# Patient Record
Sex: FEMALE | Race: WHITE
[De-identification: names, ages, dates, MRNs, and addresses within clinical notes are randomized per-mention and may not be internally consistent; named-entity substitution may affect disease eponyms.]

---

## 2017-07-20 ENCOUNTER — HOSPITAL ENCOUNTER (OUTPATIENT)
Dept: HOSPITAL 75 - RAD | Age: 62
End: 2017-07-20
Attending: NURSE PRACTITIONER
Payer: COMMERCIAL

## 2017-07-20 DIAGNOSIS — Z12.31: Primary | ICD-10-CM

## 2017-07-20 PROCEDURE — 77067 SCR MAMMO BI INCL CAD: CPT

## 2017-07-26 NOTE — DIAGNOSTIC IMAGING REPORT
Bilateral screening mammogram 2D views with tomosynthesis.



The current study was also evaluated with a Computer Aided

Detection (CAD) system.



INDICATION: Screening. No current complaints stated on the

questionnaire.



COMPARISON: 6/21/16.



FINDINGS:



The breasts are composed of heterogeneously dense parenchyma

which may decrease mammographic sensitivity. Allowing for

technique and positional differences, no suspicious change is

seen.



IMPRESSION: Dense breasts with no definite change. 



ACR BI-RADS Category 2: Benign findings.

Result letter will be mailed to the patient.

Note: At least 10% of breast cancer is not imaged by mammography.



Dictated by: 



  Dictated on workstation # [id number]

## 2018-06-15 ENCOUNTER — HOSPITAL ENCOUNTER (INPATIENT)
Dept: HOSPITAL 75 - ER | Age: 63
LOS: 2 days | Discharge: HOME | DRG: 638 | End: 2018-06-17
Attending: FAMILY MEDICINE | Admitting: FAMILY MEDICINE
Payer: COMMERCIAL

## 2018-06-15 VITALS — BODY MASS INDEX: 30.49 KG/M2 | HEIGHT: 67 IN | WEIGHT: 194.3 LBS

## 2018-06-15 VITALS — DIASTOLIC BLOOD PRESSURE: 53 MMHG | SYSTOLIC BLOOD PRESSURE: 96 MMHG

## 2018-06-15 VITALS — DIASTOLIC BLOOD PRESSURE: 64 MMHG | SYSTOLIC BLOOD PRESSURE: 114 MMHG

## 2018-06-15 VITALS — SYSTOLIC BLOOD PRESSURE: 108 MMHG | DIASTOLIC BLOOD PRESSURE: 57 MMHG

## 2018-06-15 VITALS — SYSTOLIC BLOOD PRESSURE: 116 MMHG | DIASTOLIC BLOOD PRESSURE: 59 MMHG

## 2018-06-15 DIAGNOSIS — N17.9: ICD-10-CM

## 2018-06-15 DIAGNOSIS — E86.0: ICD-10-CM

## 2018-06-15 DIAGNOSIS — I12.9: ICD-10-CM

## 2018-06-15 DIAGNOSIS — E78.5: ICD-10-CM

## 2018-06-15 DIAGNOSIS — E10.10: Primary | ICD-10-CM

## 2018-06-15 DIAGNOSIS — R05: ICD-10-CM

## 2018-06-15 DIAGNOSIS — N18.9: ICD-10-CM

## 2018-06-15 DIAGNOSIS — N39.0: ICD-10-CM

## 2018-06-15 DIAGNOSIS — M17.0: ICD-10-CM

## 2018-06-15 DIAGNOSIS — Z79.4: ICD-10-CM

## 2018-06-15 DIAGNOSIS — E10.22: ICD-10-CM

## 2018-06-15 DIAGNOSIS — E10.40: ICD-10-CM

## 2018-06-15 LAB
ALBUMIN SERPL-MCNC: 4.3 GM/DL (ref 3.2–4.5)
ALP SERPL-CCNC: 121 U/L (ref 40–136)
ALT SERPL-CCNC: 28 U/L (ref 0–55)
APTT PPP: YELLOW S
BACTERIA #/AREA URNS HPF: (no result) /HPF
BASOPHILS # BLD AUTO: 0 10^3/UL (ref 0–0.1)
BASOPHILS NFR BLD AUTO: 1 % (ref 0–10)
BILIRUB SERPL-MCNC: 0.4 MG/DL (ref 0.1–1)
BILIRUB UR QL STRIP: NEGATIVE
BUN/CREAT SERPL: 18
BUN/CREAT SERPL: 18
BUN/CREAT SERPL: 19
CALCIUM SERPL-MCNC: 8.7 MG/DL (ref 8.5–10.1)
CALCIUM SERPL-MCNC: 9.3 MG/DL (ref 8.5–10.1)
CALCIUM SERPL-MCNC: 9.8 MG/DL (ref 8.5–10.1)
CHLORIDE SERPL-SCNC: 100 MMOL/L (ref 98–107)
CHLORIDE SERPL-SCNC: 103 MMOL/L (ref 98–107)
CHLORIDE SERPL-SCNC: 96 MMOL/L (ref 98–107)
CO2 SERPL-SCNC: 12 MMOL/L (ref 21–32)
CO2 SERPL-SCNC: 7 MMOL/L (ref 21–32)
CO2 SERPL-SCNC: 7 MMOL/L (ref 21–32)
CREAT SERPL-MCNC: 2.01 MG/DL (ref 0.6–1.3)
CREAT SERPL-MCNC: 2.05 MG/DL (ref 0.6–1.3)
CREAT SERPL-MCNC: 2.11 MG/DL (ref 0.6–1.3)
EOSINOPHIL # BLD AUTO: 0 10^3/UL (ref 0–0.3)
EOSINOPHIL NFR BLD AUTO: 0 % (ref 0–10)
ERYTHROCYTE [DISTWIDTH] IN BLOOD BY AUTOMATED COUNT: 12.3 % (ref 10–14.5)
FIBRINOGEN PPP-MCNC: (no result) MG/DL
GFR SERPLBLD BASED ON 1.73 SQ M-ARVRAT: 24 ML/MIN
GFR SERPLBLD BASED ON 1.73 SQ M-ARVRAT: 25 ML/MIN
GFR SERPLBLD BASED ON 1.73 SQ M-ARVRAT: 25 ML/MIN
GLUCOSE SERPL-MCNC: 637 MG/DL (ref 70–105)
GLUCOSE SERPL-MCNC: 737 MG/DL (ref 70–105)
GLUCOSE SERPL-MCNC: 770 MG/DL (ref 70–105)
GLUCOSE UR STRIP-MCNC: (no result) MG/DL
HCT VFR BLD CALC: 33 % (ref 35–52)
HGB BLD-MCNC: 11.2 G/DL (ref 11.5–16)
KETONES UR QL STRIP: (no result)
LEUKOCYTE ESTERASE UR QL STRIP: (no result)
LYMPHOCYTES # BLD AUTO: 1 X 10^3 (ref 1–4)
LYMPHOCYTES NFR BLD AUTO: 12 % (ref 12–44)
MANUAL DIFFERENTIAL PERFORMED BLD QL: NO
MCH RBC QN AUTO: 30 PG (ref 25–34)
MCHC RBC AUTO-ENTMCNC: 34 G/DL (ref 32–36)
MCV RBC AUTO: 89 FL (ref 80–99)
MONOCYTES # BLD AUTO: 0.3 X 10^3 (ref 0–1)
MONOCYTES NFR BLD AUTO: 4 % (ref 0–12)
NEUTROPHILS # BLD AUTO: 7 X 10^3 (ref 1.8–7.8)
NEUTROPHILS NFR BLD AUTO: 83 % (ref 42–75)
NITRITE UR QL STRIP: NEGATIVE
PH UR STRIP: 5 [PH] (ref 5–9)
PLATELET # BLD: 330 10^3/UL (ref 130–400)
PMV BLD AUTO: 10 FL (ref 7.4–10.4)
POTASSIUM SERPL-SCNC: 4.7 MMOL/L (ref 3.6–5)
POTASSIUM SERPL-SCNC: 5.4 MMOL/L (ref 3.6–5)
POTASSIUM SERPL-SCNC: 6.2 MMOL/L (ref 3.6–5)
PROT SERPL-MCNC: 7 GM/DL (ref 6.4–8.2)
PROT UR QL STRIP: NEGATIVE
RBC # BLD AUTO: 3.69 10^6/UL (ref 4.35–5.85)
RBC #/AREA URNS HPF: (no result) /HPF
SODIUM SERPL-SCNC: 132 MMOL/L (ref 135–145)
SODIUM SERPL-SCNC: 132 MMOL/L (ref 135–145)
SODIUM SERPL-SCNC: 134 MMOL/L (ref 135–145)
SP GR UR STRIP: 1.01 (ref 1.02–1.02)
UROBILINOGEN UR-MCNC: NORMAL MG/DL
WBC # BLD AUTO: 8.4 10^3/UL (ref 4.3–11)
WBC #/AREA URNS HPF: (no result) /HPF

## 2018-06-15 PROCEDURE — 80053 COMPREHEN METABOLIC PANEL: CPT

## 2018-06-15 PROCEDURE — 83735 ASSAY OF MAGNESIUM: CPT

## 2018-06-15 PROCEDURE — 87088 URINE BACTERIA CULTURE: CPT

## 2018-06-15 PROCEDURE — 81000 URINALYSIS NONAUTO W/SCOPE: CPT

## 2018-06-15 PROCEDURE — 71046 X-RAY EXAM CHEST 2 VIEWS: CPT

## 2018-06-15 PROCEDURE — 87081 CULTURE SCREEN ONLY: CPT

## 2018-06-15 PROCEDURE — 82962 GLUCOSE BLOOD TEST: CPT

## 2018-06-15 PROCEDURE — 36415 COLL VENOUS BLD VENIPUNCTURE: CPT

## 2018-06-15 PROCEDURE — 83036 HEMOGLOBIN GLYCOSYLATED A1C: CPT

## 2018-06-15 PROCEDURE — 84100 ASSAY OF PHOSPHORUS: CPT

## 2018-06-15 PROCEDURE — 80048 BASIC METABOLIC PNL TOTAL CA: CPT

## 2018-06-15 PROCEDURE — 71045 X-RAY EXAM CHEST 1 VIEW: CPT

## 2018-06-15 PROCEDURE — 84484 ASSAY OF TROPONIN QUANT: CPT

## 2018-06-15 PROCEDURE — 96361 HYDRATE IV INFUSION ADD-ON: CPT

## 2018-06-15 PROCEDURE — 93005 ELECTROCARDIOGRAM TRACING: CPT

## 2018-06-15 PROCEDURE — 96375 TX/PRO/DX INJ NEW DRUG ADDON: CPT

## 2018-06-15 PROCEDURE — 85025 COMPLETE CBC W/AUTO DIFF WBC: CPT

## 2018-06-15 PROCEDURE — 96365 THER/PROPH/DIAG IV INF INIT: CPT

## 2018-06-15 RX ADMIN — DEXTROSE MONOHYDRATE, SODIUM CHLORIDE, AND POTASSIUM CHLORIDE SCH MLS/HR: 50; 4.5; 1.49 INJECTION, SOLUTION INTRAVENOUS at 23:09

## 2018-06-15 RX ADMIN — DEXTROSE MONOHYDRATE, SODIUM CHLORIDE, AND POTASSIUM CHLORIDE SCH MLS/HR: 50; 4.5; 1.49 INJECTION, SOLUTION INTRAVENOUS at 20:04

## 2018-06-15 RX ADMIN — SODIUM CHLORIDE SCH MLS/HR: 900 INJECTION, SOLUTION INTRAVENOUS at 20:06

## 2018-06-15 RX ADMIN — SODIUM CHLORIDE AND POTASSIUM CHLORIDE SCH MLS/HR: 4.5; 1.49 INJECTION, SOLUTION INTRAVENOUS at 20:04

## 2018-06-15 RX ADMIN — DEXTROSE MONOHYDRATE SCH MLS/HR: 100 INJECTION, SOLUTION INTRAVENOUS at 20:04

## 2018-06-15 RX ADMIN — SODIUM CHLORIDE SCH MLS/HR: 4.5 INJECTION, SOLUTION INTRAVENOUS at 23:08

## 2018-06-15 RX ADMIN — SODIUM CHLORIDE SCH MLS/HR: 900 INJECTION, SOLUTION INTRAVENOUS at 20:05

## 2018-06-15 RX ADMIN — SODIUM CHLORIDE SCH MLS/HR: 900 INJECTION, SOLUTION INTRAVENOUS at 19:08

## 2018-06-15 RX ADMIN — SODIUM CHLORIDE SCH MLS/HR: 4.5 INJECTION, SOLUTION INTRAVENOUS at 20:04

## 2018-06-15 RX ADMIN — DEXTROSE MONOHYDRATE AND SODIUM CHLORIDE SCH MLS/HR: 5; .45 INJECTION, SOLUTION INTRAVENOUS at 19:12

## 2018-06-15 RX ADMIN — SODIUM CHLORIDE AND POTASSIUM CHLORIDE SCH MLS/HR: 4.5; 1.49 INJECTION, SOLUTION INTRAVENOUS at 23:44

## 2018-06-15 NOTE — ED GENERAL
General


Chief Complaint:  General Problems/Pain


Stated Complaint:  DEHYDRATION


Source of Information:  Patient


Exam Limitations:  No Limitations





History of Present Illness


Date Seen by Provider:  Yonatan 15, 2018


Time Seen by Provider:  17:11


Initial Comments


To ER per private vehicle with concerns of dehydration. She has a diabetic with 

insulin pump. She was feeling fine until about 3 days ago and that she had a 

sensation of general malaise and weakness, about her. She is a  patient of Dr. Salamanca at [facility] and Brady GARCIA nurse practitioner at [facility].

  Her blood sugar yesterday was in the 750 range, today just before arrival 

here was 501 and she turned her insulin pump off because she states "I didn't 

have my wits about me".


Timing/Duration:  2-3 Days


Severity:  Moderate


Associated Systoms:  Headaches, Malaise, Nausea/Vomiting





Allergies and Home Medications


Allergies


Uncoded Allergies:  


     SULFA (Adverse Reaction, 11)





Home Medications


Metformin Hcl 500 Mg Tablet, 2 PO DAILY, (Reported)





Patient Home Medication List


Home Medication List Reviewed:  Yes





Review of Systems


Constitutional:  see HPI


EENTM:  see HPI


Respiratory:  no symptoms reported


Cardiovascular:  see HPI, chest pain (she has had chest pain but none currently)


Genitourinary:  no symptoms reported


Musculoskeletal:  no symptoms reported


Skin:  no symptoms reported





Past Medical-Social-Family Hx


Patient Social History


Recent Foreign Travel:  No


Contact w/Someone Who Travel:  No





Physical Exam


Vital Signs





Vital Signs - First Documented








 6/15/18





 16:50


 


Temp 97.8


 


Pulse 94


 


Resp 20


 


B/P (MAP) 125/57 (79)


 


Pulse Ox 97





Capillary Refill :


General Appearance:  No Apparent Distress, WD/WN


Eyes:  Bilateral Eye Normal Inspection, Bilateral Eye PERRL, Bilateral Eye EOMI


HEENT:  PERRL/EOMI, TMs Normal


Neck:  Full Range of Motion, Normal Inspection


Respiratory:  Normal Breath Sounds, No Accessory Muscle Use, No Respiratory 

Distress


Cardiovascular:  Regular Rate, Rhythm, Normal Peripheral Pulses


Gastrointestinal:  Normal Bowel Sounds, Non Tender, Soft


Extremity:  Normal Capillary Refill, Normal Inspection


Neurologic/Psychiatric:  Alert, Oriented x3


Skin:  Normal Color, Warm/Dry





Progress/Results/Core Measures


Suspected Sepsis


SIRS


Temperature: 


Pulse:  


Respiratory Rate: 


 


Laboratory Tests


6/15/18 17:05: White Blood Count 8.4


Blood Pressure  / 


Mean: 


 





Laboratory Tests


6/15/18 17:05: 


Creatinine 2.11H, Platelet Count 330, Total Bilirubin 0.4








Results/Orders


Lab Results





Laboratory Tests








Test


 6/15/18


17:00 6/15/18


17:05 6/15/18


17:27 Range/Units


 


 


Urine Color YELLOW     


 


Urine Clarity


 SLIGHTLY


CLOUDY 


 


  





 


Urine pH 5    5-9  


 


Urine Specific Gravity 1.015 L   1.016-1.022  


 


Urine Protein NEGATIVE    NEGATIVE  


 


Urine Glucose (UA) 4+ H   NEGATIVE  


 


Urine Ketones 4+ H   NEGATIVE  


 


Urine Nitrite NEGATIVE    NEGATIVE  


 


Urine Bilirubin NEGATIVE    NEGATIVE  


 


Urine Urobilinogen NORMAL    NORMAL  MG/DL


 


Urine Leukocyte Esterase 3+ H   NEGATIVE  


 


Urine RBC (Auto) NEGATIVE    NEGATIVE  


 


Urine RBC NONE     /HPF


 


Urine WBC 25-50 H    /HPF


 


Urine Squamous Epithelial


Cells 10-25 H


 


 


  /HPF





 


Urine Crystals NONE     /LPF


 


Urine Bacteria FEW H    /HPF


 


Urine Casts NONE     /LPF


 


Urine Mucus NEGATIVE     /LPF


 


Urine Culture Indicated YES     


 


White Blood Count


 


 8.4 


 


 4.3-11.0


10^3/uL


 


Red Blood Count


 


 3.69 L


 


 4.35-5.85


10^6/uL


 


Hemoglobin  11.2 L  11.5-16.0  G/DL


 


Hematocrit  33 L  35-52  %


 


Mean Corpuscular Volume  89   80-99  FL


 


Mean Corpuscular Hemoglobin  30   25-34  PG


 


Mean Corpuscular Hemoglobin


Concent 


 34 


 


 32-36  G/DL





 


Red Cell Distribution Width  12.3   10.0-14.5  %


 


Platelet Count


 


 330 


 


 130-400


10^3/uL


 


Mean Platelet Volume  10.0   7.4-10.4  FL


 


Neutrophils (%) (Auto)  83 H  42-75  %


 


Lymphocytes (%) (Auto)  12   12-44  %


 


Monocytes (%) (Auto)  4   0-12  %


 


Eosinophils (%) (Auto)  0   0-10  %


 


Basophils (%) (Auto)  1   0-10  %


 


Neutrophils # (Auto)  7.0   1.8-7.8  X 10^3


 


Lymphocytes # (Auto)  1.0   1.0-4.0  X 10^3


 


Monocytes # (Auto)  0.3   0.0-1.0  X 10^3


 


Eosinophils # (Auto)


 


 0.0 


 


 0.0-0.3


10^3/uL


 


Basophils # (Auto)


 


 0.0 


 


 0.0-0.1


10^3/uL


 


Sodium Level  132 L  135-145  MMOL/L


 


Potassium Level  5.4 H  3.6-5.0  MMOL/L


 


Chloride Level  96 L    MMOL/L


 


Carbon Dioxide Level  12 L  21-32  MMOL/L


 


Anion Gap  24 H  5-14  MMOL/L


 


Blood Urea Nitrogen  38 H  7-18  MG/DL


 


Creatinine


 


 2.11 H


 


 0.60-1.30


MG/DL


 


Estimat Glomerular Filtration


Rate 


 24 


 


  





 


BUN/Creatinine Ratio  18    


 


Glucose Level  770 *H    MG/DL


 


Calcium Level  9.8   8.5-10.1  MG/DL


 


Total Bilirubin  0.4   0.1-1.0  MG/DL


 


Aspartate Amino Transf


(AST/SGOT) 


 17 


 


 5-34  U/L





 


Alanine Aminotransferase


(ALT/SGPT) 


 28 


 


 0-55  U/L





 


Alkaline Phosphatase  121     U/L


 


Troponin I  < 0.30   <0.30  NG/ML


 


Total Protein  7.0   6.4-8.2  GM/DL


 


Albumin  4.3   3.2-4.5  GM/DL


 


Glucometer   > 600 *H   MG/DL








My Orders





Orders - MARINA OWEN


Cbc With Automated Diff (6/15/18 17:09)


Comprehensive Metabolic Panel (6/15/18 17:09)


Ua Culture If Indicated (6/15/18 17:09)


Iv Heplock-Insert (Order) (6/15/18 17:09)


Accucheck Stat ONCE (6/15/18 17:09)


Chest Pa/Lat (2 View) (6/15/18 17:09)


Ns Iv 1000 Ml (Sodium Chloride 0.9%) (6/15/18 17:15)


Insulin (Regular) Human (Humulin R (Per (6/15/18 17:15)


Ekg Tracing (6/15/18 17:13)


Troponin I (6/15/18 17:13)


Urine Culture (6/15/18 17:00)


Ceftriaxone Injection (Rocephin Injectio (6/15/18 17:30)





Medications Given in ED





Current Medications








 Medications  Dose


 Ordered  Sig/Casey


 Route  Start Time


 Stop Time Status Last Admin


Dose Admin


 


 Ceftriaxone


 Sodium 1000 mg/


 Sodium Chloride  50 ml @ 


 100 mls/hr  ONCE  ONCE


 IV  6/15/18 17:30


 6/15/18 17:59 DC 6/15/18 17:30


100 MLS/HR


 


 Insulin Human


 Regular  10 unit  ONCE  ONCE


 IV  6/15/18 17:15


 6/15/18 17:16 DC 6/15/18 17:30


10 UNIT








Vital Signs/I&O











 6/15/18





 16:50


 


Temp 97.8


 


Pulse 94


 


Resp 20


 


B/P (MAP) 125/57 (79)


 


Pulse Ox 97





Capillary Refill :





Diagnostic Imaging





   Diagonstic Imaging:  Xray


Comments


NAME:   MELANI HARMON


[facility] REC#:   [id number]


ACCOUNT#:   [id number]


PT STATUS:   REG ER


:   1955


PHYSICIAN:   MARINA OWEN


ADMIT DATE:   06/15/18/ER


 ***Draft***


Date of Exam:06/15/18





CHEST PA/LAT (2 VIEW)








INDICATION: Malaise and illness.





EXAMINATION: PA and lateral views of the chest were obtained.





FINDINGS: Heart size and pulmonary vascularity are within normal


limits. There is no pneumothorax or consolidation. Tiny nodular


density in the right midlung may be due to prominent vessel or


granuloma. There is no evidence of infiltrate, pneumothorax or


pleural fluid. Metallic linear densities project over the left


shoulder and may reside within a clavicle.





IMPRESSION: No acute abnormality is identified. Possible metallic


foreign body is seen in region of the left clavicle. 





  Dictated on workstation # [id number]








Dict:   06/15/18 175


Trans:   06/15/18 1757


[facility] [phone]





Interpreted by:     MEHREEN DOSS MD


Electronically signed by:





Departure


Communication (Admissions)


Time/Spoke to Admitting Phy:  18:11


Spoke with Dr. Mayer, agrees to admit. We'll place in the ICU on DKA protocol.


I did clarify with the patient in regards to whether or not she sees Brady GARCIA 

at the walk-in clinic or at scheduled visits. She states that she sees him 

during scheduled clinic visits. As such, we'll proceed with admission to 

[facility].





Impression





 Primary Impression:  


 DKA (diabetic ketoacidoses)


 Additional Impression:  


 Urinary tract infection


Disposition:   ADMITTED AS INPATIENT


Condition:  Stable





Admissions


Decision to Admit Reason:  Admit from ER (General)


Decision to Admit/Date:  Yonatan 15, 2018


Time/Decision to Admit Time:  17:59





Departure-Patient Inst.


Referrals:  


IBAN SALAMANCA DO (PCP/Family)


Primary Care Physician











MARINA OWEN Yonatan 15, 2018 17:13

## 2018-06-15 NOTE — XMS REPORT
Continuity of Care Document

 Created on: 06/15/2018



MELANI HARMON

External Reference #: [id number]

: 1955

Sex: Female



Demographics







 Address  [address]

[place]  [address]

 

 Home Phone  [phone] x

 

 Preferred Language  Unknown

 

 Marital Status  Unknown

 

 Jehovah's witness Affiliation  Unknown

 

 Race  Unknown

 

 Ethnic Group  Unknown





Author







 Author  [facility]

 

 Address  Unknown

 

 Phone  Unavailable



              



Allergies

      





 Active            Description            Code            Type            
Severity            Reaction            Onset            Reported/Identified   
         Relationship to Patient            Clinical Status        

 

 Yes            SULFA            SULFA                         Unknown         
   N/A                         2011                                  



                  



Medications

      



There is no data.                  



Problems

      





 Date Dx Coded            Attending            Type            Code            
Diagnosis            Diagnosed By        

 

 2011                         Ot            251.0                        
          

 

 2011                         Ot            251.2                        
          

 

 2011                         Ot            V58.67                       
           

 

 2013            MERCY ARCOS MD                         244.9         
   HYPOTHYROIDISM                     

 

 2013            MERCY ARCOS MD                         250.02        
    DIABETES MELLITUS TYPE 2 - UNCOMPLICATED, UNCONTROLLED                     

 

 2013            MERCY ARCOS MD                         796.2         
   Blood Pressure Isolated Elevated                     

 

 2013            MERCY ARCOS MD                         V07.4         
   taking female hormones for postmenopausal HRT                     

 

 2013                                      244.9            
HYPOTHYROIDISM                     

 

 2013                                      250.02            DIABETES 
MELLITUS TYPE 2 - UNCOMPLICATED, UNCONTROLLED                     

 

 2013                                      796.2            Blood 
Pressure Isolated Elevated                     

 

 2013                                      V07.4            taking female 
hormones for postmenopausal HRT                     

 

 2013            AV CRAIG MD                         244.9   
         HYPOTHYROIDISM                     

 

 2013            AV CRAIG MD                         250.02  
          DIABETES MELLITUS TYPE 2 - UNCOMPLICATED, UNCONTROLLED               
      

 

 2013            AV CRAIG MD                         796.2   
         Blood Pressure Isolated Elevated                     

 

 2013            AV CRAIG MD                         V07.4   
         taking female hormones for postmenopausal HRT                     

 

 2013                                      401.1            ESSENTIAL 
HYPERTENSION BENIGN                     

 

 2013            AV CRAIG MD                         401.1   
         ESSENTIAL HYPERTENSION BENIGN                     

 

 2013            AV CRAIG MD                         682.2   
         SKIN ABSCESS OF THE TRUNK - GROIN                     

 

 2015                         Ot            793.80                       
           

 

 2015            COSENS DO IBAN L            Ot            V76.12      
                            

 

 2015            COSENS DO IBAN L            Ot            V76.12      
                            

 

 2016            COSENS DO IBAN L            Ot            V76.12      
      OT SCREEN MAMMO-MALIGN NEOPLASM OF GUZMAN                     

 

 2016            MALORIE SEO            Ot            Z12.31   
         ENCNTR SCREEN MAMMOGRAM FOR MALIGNANT NE                     

 

 2016            MALORIE SEO            Ot            Z12.31   
         ENCNTR SCREEN MAMMOGRAM FOR MALIGNANT NE                     

 

 2017            IBAN SALAMANCA DO            Ot            V76.12      
      OTH SCREEN MAMMO-MALIGN NEOPLASM OF GUZMAN                     

 

 2017            MALORIE SEO            Ot            Z12.31   
         ENCNTR SCREEN MAMMOGRAM FOR MALIGNANT NE                     

 

 2017            ITZEL NEWSOME APRTERRI            Ot            Z12.31    
        ENCNTR SCREEN MAMMOGRAM FOR MALIGNANT NE                     

 

 2017            ITZEL NEWSOME APRN            Ot            Z12.31    
        ENCNTR SCREEN MAMMOGRAM FOR MALIGNANT NE                     



                                                                        



Procedures

      





 Code            Description            Performed By            Performed On   
     

 

             09981                                  A1C (IN-HOUSE)             
                      2013        

 

             [id number]                                  CULTURE MRSA               
                    2013        



                    



Results

      



There is no data.              



Encounters

      





 ACCT No.            Visit Date/Time            Discharge            Status    
        Pt. Type            Provider            Facility            Loc./Unit  
          Complaint        

 

 [id number]            2013 16:13:00            2013 23:59:59          
  CLS            Outpatient            AV CRAIG MD                 
                              

 

 [id number]            2013 16:16:00            2013 23:59:59          
  CLS            Outpatient            MERCY ARCOS MD                       
                        

 

 [id number]            2013 16:11:00                                      
Document Registration                                                          
  

 

 [id number]            2017 15:17:00            2017 23:59:59    
        CLS            Outpatient            ITZEL NEWSOME            
[facility]            RAD            SCREENING        

 

 [id number]            2016 13:21:00            2016 23:59:59    
        CLS            Outpatient            MALORIE SEO            
[facility]            RAD            SCREENING        

 

 [id number]            2015 15:09:00            2015 23:59:59    
        CLS            Outpatient            IBAN SALAMANCA DO            [facility]            RAD            SCREENING        

 

 [id number]            2011 14:59:00                                   
   Document Registration                                                       
     

 

 [id number]            2011 16:46:00                                   
   Document Registration

## 2018-06-15 NOTE — DIAGNOSTIC IMAGING REPORT
INDICATION: Malaise and illness.



EXAMINATION: PA and lateral views of the chest were obtained.



FINDINGS: Heart size and pulmonary vascularity are within normal

limits. There is no pneumothorax or consolidation. Tiny nodular

density in the right midlung may be due to prominent vessel or

granuloma. There is no evidence of infiltrate, pneumothorax or

pleural fluid. Metallic linear densities project over the left

shoulder and may reside within a clavicle.



IMPRESSION: No acute abnormality is identified. Possible metallic

foreign body is seen in region of the left clavicle. 



Dictated by: 



  Dictated on workstation # [id number]

## 2018-06-16 VITALS — DIASTOLIC BLOOD PRESSURE: 94 MMHG | SYSTOLIC BLOOD PRESSURE: 131 MMHG

## 2018-06-16 VITALS — SYSTOLIC BLOOD PRESSURE: 122 MMHG | DIASTOLIC BLOOD PRESSURE: 88 MMHG

## 2018-06-16 VITALS — SYSTOLIC BLOOD PRESSURE: 118 MMHG | DIASTOLIC BLOOD PRESSURE: 59 MMHG

## 2018-06-16 VITALS — SYSTOLIC BLOOD PRESSURE: 127 MMHG | DIASTOLIC BLOOD PRESSURE: 62 MMHG

## 2018-06-16 VITALS — SYSTOLIC BLOOD PRESSURE: 137 MMHG | DIASTOLIC BLOOD PRESSURE: 67 MMHG

## 2018-06-16 VITALS — DIASTOLIC BLOOD PRESSURE: 55 MMHG | SYSTOLIC BLOOD PRESSURE: 118 MMHG

## 2018-06-16 VITALS — SYSTOLIC BLOOD PRESSURE: 123 MMHG | DIASTOLIC BLOOD PRESSURE: 65 MMHG

## 2018-06-16 VITALS — DIASTOLIC BLOOD PRESSURE: 59 MMHG | SYSTOLIC BLOOD PRESSURE: 115 MMHG

## 2018-06-16 VITALS — SYSTOLIC BLOOD PRESSURE: 123 MMHG | DIASTOLIC BLOOD PRESSURE: 75 MMHG

## 2018-06-16 VITALS — DIASTOLIC BLOOD PRESSURE: 58 MMHG | SYSTOLIC BLOOD PRESSURE: 108 MMHG

## 2018-06-16 VITALS — DIASTOLIC BLOOD PRESSURE: 70 MMHG | SYSTOLIC BLOOD PRESSURE: 140 MMHG

## 2018-06-16 VITALS — SYSTOLIC BLOOD PRESSURE: 122 MMHG | DIASTOLIC BLOOD PRESSURE: 59 MMHG

## 2018-06-16 VITALS — SYSTOLIC BLOOD PRESSURE: 121 MMHG | DIASTOLIC BLOOD PRESSURE: 81 MMHG

## 2018-06-16 VITALS — DIASTOLIC BLOOD PRESSURE: 66 MMHG | SYSTOLIC BLOOD PRESSURE: 129 MMHG

## 2018-06-16 VITALS — DIASTOLIC BLOOD PRESSURE: 82 MMHG | SYSTOLIC BLOOD PRESSURE: 127 MMHG

## 2018-06-16 VITALS — DIASTOLIC BLOOD PRESSURE: 65 MMHG | SYSTOLIC BLOOD PRESSURE: 115 MMHG

## 2018-06-16 VITALS — SYSTOLIC BLOOD PRESSURE: 98 MMHG | DIASTOLIC BLOOD PRESSURE: 46 MMHG

## 2018-06-16 VITALS — SYSTOLIC BLOOD PRESSURE: 131 MMHG | DIASTOLIC BLOOD PRESSURE: 62 MMHG

## 2018-06-16 LAB
BASOPHILS # BLD AUTO: 0 10^3/UL (ref 0–0.1)
BASOPHILS NFR BLD AUTO: 0 % (ref 0–10)
BUN/CREAT SERPL: 20
BUN/CREAT SERPL: 23
BUN/CREAT SERPL: 23
CALCIUM SERPL-MCNC: 8.5 MG/DL (ref 8.5–10.1)
CHLORIDE SERPL-SCNC: 108 MMOL/L (ref 98–107)
CHLORIDE SERPL-SCNC: 108 MMOL/L (ref 98–107)
CHLORIDE SERPL-SCNC: 112 MMOL/L (ref 98–107)
CO2 SERPL-SCNC: 14 MMOL/L (ref 21–32)
CO2 SERPL-SCNC: 17 MMOL/L (ref 21–32)
CO2 SERPL-SCNC: 18 MMOL/L (ref 21–32)
CREAT SERPL-MCNC: 1.34 MG/DL (ref 0.6–1.3)
CREAT SERPL-MCNC: 1.41 MG/DL (ref 0.6–1.3)
CREAT SERPL-MCNC: 1.95 MG/DL (ref 0.6–1.3)
EOSINOPHIL # BLD AUTO: 0 10^3/UL (ref 0–0.3)
EOSINOPHIL NFR BLD AUTO: 0 % (ref 0–10)
ERYTHROCYTE [DISTWIDTH] IN BLOOD BY AUTOMATED COUNT: 12.1 % (ref 10–14.5)
GFR SERPLBLD BASED ON 1.73 SQ M-ARVRAT: 26 ML/MIN
GFR SERPLBLD BASED ON 1.73 SQ M-ARVRAT: 38 ML/MIN
GFR SERPLBLD BASED ON 1.73 SQ M-ARVRAT: 40 ML/MIN
GLUCOSE SERPL-MCNC: 120 MG/DL (ref 70–105)
GLUCOSE SERPL-MCNC: 177 MG/DL (ref 70–105)
GLUCOSE SERPL-MCNC: 379 MG/DL (ref 70–105)
HCT VFR BLD CALC: 28 % (ref 35–52)
HGB BLD-MCNC: 9.6 G/DL (ref 11.5–16)
LYMPHOCYTES # BLD AUTO: 2 X 10^3 (ref 1–4)
LYMPHOCYTES NFR BLD AUTO: 23 % (ref 12–44)
MAGNESIUM SERPL-MCNC: 2.2 MG/DL (ref 1.8–2.4)
MANUAL DIFFERENTIAL PERFORMED BLD QL: NO
MCH RBC QN AUTO: 30 PG (ref 25–34)
MCHC RBC AUTO-ENTMCNC: 34 G/DL (ref 32–36)
MCV RBC AUTO: 88 FL (ref 80–99)
MONOCYTES # BLD AUTO: 0.7 X 10^3 (ref 0–1)
MONOCYTES NFR BLD AUTO: 8 % (ref 0–12)
NEUTROPHILS # BLD AUTO: 5.9 X 10^3 (ref 1.8–7.8)
NEUTROPHILS NFR BLD AUTO: 69 % (ref 42–75)
PHOSPHATE SERPL-MCNC: 2.9 MG/DL (ref 2.3–4.7)
PLATELET # BLD: 285 10^3/UL (ref 130–400)
PMV BLD AUTO: 9.1 FL (ref 7.4–10.4)
POTASSIUM SERPL-SCNC: 3.7 MMOL/L (ref 3.6–5)
POTASSIUM SERPL-SCNC: 3.9 MMOL/L (ref 3.6–5)
POTASSIUM SERPL-SCNC: 4.5 MMOL/L (ref 3.6–5)
RBC # BLD AUTO: 3.16 10^6/UL (ref 4.35–5.85)
SODIUM SERPL-SCNC: 136 MMOL/L (ref 135–145)
SODIUM SERPL-SCNC: 138 MMOL/L (ref 135–145)
SODIUM SERPL-SCNC: 139 MMOL/L (ref 135–145)
WBC # BLD AUTO: 8.6 10^3/UL (ref 4.3–11)

## 2018-06-16 RX ADMIN — DEXTROSE MONOHYDRATE, SODIUM CHLORIDE, AND POTASSIUM CHLORIDE SCH MLS/HR: 50; 4.5; 1.49 INJECTION, SOLUTION INTRAVENOUS at 06:29

## 2018-06-16 RX ADMIN — GABAPENTIN SCH MG: 100 CAPSULE ORAL at 11:54

## 2018-06-16 RX ADMIN — ENOXAPARIN SODIUM SCH MG: 100 INJECTION SUBCUTANEOUS at 11:55

## 2018-06-16 RX ADMIN — SODIUM CHLORIDE SCH MLS/HR: 4.5 INJECTION, SOLUTION INTRAVENOUS at 11:51

## 2018-06-16 RX ADMIN — SODIUM CHLORIDE AND POTASSIUM CHLORIDE SCH MLS/HR: 4.5; 1.49 INJECTION, SOLUTION INTRAVENOUS at 03:43

## 2018-06-16 RX ADMIN — SODIUM CHLORIDE SCH MLS/HR: 900 INJECTION INTRAVENOUS at 08:41

## 2018-06-16 RX ADMIN — DEXTROSE MONOHYDRATE, SODIUM CHLORIDE, AND POTASSIUM CHLORIDE SCH MLS/HR: 50; 4.5; 1.49 INJECTION, SOLUTION INTRAVENOUS at 03:44

## 2018-06-16 RX ADMIN — SODIUM CHLORIDE SCH MLS/HR: 4.5 INJECTION, SOLUTION INTRAVENOUS at 06:29

## 2018-06-16 RX ADMIN — SODIUM CHLORIDE SCH MLS/HR: 4.5 INJECTION, SOLUTION INTRAVENOUS at 03:44

## 2018-06-16 RX ADMIN — DEXTROSE MONOHYDRATE SCH MLS/HR: 100 INJECTION, SOLUTION INTRAVENOUS at 07:41

## 2018-06-16 RX ADMIN — GABAPENTIN SCH MG: 100 CAPSULE ORAL at 20:43

## 2018-06-16 RX ADMIN — LEVOTHYROXINE SODIUM SCH MCG: 125 TABLET ORAL at 11:54

## 2018-06-16 RX ADMIN — DEXTROSE MONOHYDRATE AND SODIUM CHLORIDE SCH MLS/HR: 5; .45 INJECTION, SOLUTION INTRAVENOUS at 04:53

## 2018-06-16 RX ADMIN — DEXTROSE MONOHYDRATE, SODIUM CHLORIDE, AND POTASSIUM CHLORIDE SCH MLS/HR: 50; 4.5; 1.49 INJECTION, SOLUTION INTRAVENOUS at 11:51

## 2018-06-16 RX ADMIN — DEXTROSE MONOHYDRATE SCH MLS/HR: 100 INJECTION, SOLUTION INTRAVENOUS at 05:42

## 2018-06-16 NOTE — DIAGNOSTIC IMAGING REPORT
Indication: Dyspnea, diabetic ketoacidosis.



Discussion: Single portable upright view of the chest was

obtained, comparison 06/15/2018. Atelectasis is noted within the

lingula. No focal consolidation, pleural fluid, or pneumothorax.

Stable normal heart size. Questionable metallic foreign body

along the left clavicle is stable.



Impression:

1. Stable chest.



Dictated by: 



  Dictated on workstation # [id number]

## 2018-06-16 NOTE — HISTORY & PHYSICIAL ([facility])
HPI


History of Present Illness:


This is a 62 yo female patient who sees SOFY Garcia at [facility] for primary care as well 

as Dr. Regan for [facility].  Pt has hx of DM - she reports she was diagnosed 

with DM Type 1 in  at age 43.  She has been on an insulin pump.  She 

reports her last A1c was 9.6 last month.  Patient previously was seeing Dr. Waters but was not able to coordinate her appointments due to work schedule.





Pt reports she was seen at her work medical clinic last Thursday for cough and 

because her  had been diagnosed with pneumonia.  She denied fever but 

states she had blood test that showed pneumonia and she was started on an 

antibiotic (Doxycycline) and Symbicort.  She reports her BS have been elevated 

and she states she was feeling out of it and she did not feel like she could 

work her pump so she stopped her insulin pump.  She was seen at the  ER and 

found to be in DKA w/ AG 24 on admission.


Source:  patient


Exam Limitations:  no limitations


Date seen by provider:  2018


Time Seen by Provider:  08:50


Attending Physician


Ed Mayer DO


PCP


SOFY Garcia, Andrey Jacques DO - [facility]


Consult





Date of Admission


Yonatan 15, 2018 at 18:04





Home Medications


Home Medications


Reviewed patient Home Medication Reconciliation performed by pharmacy 

medication reconciliations technician and/or nursing.


Patients Allergies have been reviewed.





Allergies


Uncoded Allergies:  


     SULFA (Adverse Reaction, Unknown, 6/15/18)





PMH-Social-Family Hx


Patient Social History


Alcohol Use:  Denies Use


Recreational Drug Use:  No


Smoking Status:  Never a Smoker


2nd Hand Smoke Exposure:  No


Recent Foreign Travel:  No


Contact w/other who traveled:  No


Recent Hopitalizations:  No


Recent Infectious Disease Expo:  No


Physical Abuse Screen:  No


Sexual Abuse:  No





Immunizations Up To Date


Date of Pneumonia Vaccine:  2016





Past Medical History





DM Type 1


Hypertension


CKD


Hyperlipidemia


Diabetic Neuropathy


Hypothyroidism


Chronic UTI


OA knees





PSH:


NOEL/BSO


L knee scope


umbilical hernia repair





Family Medical History


Family History:  


Degenerative disc disease


  G8 BROTHER, Onset:60 years & older


FHx: rheumatoid arthritis


  G8 BROTHER, Onset:60 years & older


Lymphedema


  19 FATHER, , Age:88, Onset:60 years & older





Review of Systems ([facility])


Constitutional:  malaise, weakness


Respiratory:  cough


Cardiovascular:  no symptoms reported


Gastrointestinal:  abdominal pain


Genitourinary:  other (chronic UTI)


Musculoskeletal:  joint pain





Reviewed Test Results


Reviewed Test Results


Lab


Laboratory Tests


6/15/18 17:00: 


Urine Color YELLOW, Urine Clarity SLIGHTLY CLOUDY, Urine pH 5, Urine Specific 

Gravity 1.015L, Urine Protein NEGATIVE, Urine Glucose (UA) 4+H, Urine Ketones 4+

H, Urine Nitrite NEGATIVE, Urine Bilirubin NEGATIVE, Urine Urobilinogen NORMAL, 

Urine Leukocyte Esterase 3+H, Urine RBC (Auto) NEGATIVE, Urine RBC NONE, Urine 

WBC 25-50H, Urine Squamous Epithelial Cells 10-25H, Urine Crystals NONE, Urine 

Bacteria FEWH, Urine Casts NONE, Urine Mucus NEGATIVE, Urine Culture Indicated 

YES


6/15/18 17:05: 


White Blood Count 8.4, Red Blood Count 3.69L, Hemoglobin 11.2L, Hematocrit 33L, 

Mean Corpuscular Volume 89, Mean Corpuscular Hemoglobin 30, Mean Corpuscular 

Hemoglobin Concent 34, Red Cell Distribution Width 12.3, Platelet Count 330, 

Mean Platelet Volume 10.0, Neutrophils (%) (Auto) 83H, Lymphocytes (%) (Auto) 12

, Monocytes (%) (Auto) 4, Eosinophils (%) (Auto) 0, Basophils (%) (Auto) 1, 

Neutrophils # (Auto) 7.0, Lymphocytes # (Auto) 1.0, Monocytes # (Auto) 0.3, 

Eosinophils # (Auto) 0.0, Basophils # (Auto) 0.0, Sodium Level 132L, Potassium 

Level 5.4H, Chloride Level 96L, Carbon Dioxide Level 12L, Anion Gap 24H, Blood 

Urea Nitrogen 38H, Creatinine 2.11H, Estimat Glomerular Filtration Rate 24, BUN/

Creatinine Ratio 18, Glucose Level 770*H, Calcium Level 9.8, Total Bilirubin 0.4

, Aspartate Amino Transf (AST/SGOT) 17, Alanine Aminotransferase (ALT/SGPT) 28, 

Alkaline Phosphatase 121, Troponin I < 0.30, Total Protein 7.0, Albumin 4.3


6/15/18 17:27: Glucometer > 600*H


6/15/18 18:40: Glucometer 583*H


6/15/18 19:01: Glucometer > 600*H


6/15/18 19:20: 


Sodium Level 132L, Potassium Level 6.2H, Chloride Level 100, Carbon Dioxide 

Level 7*L, Anion Gap 25H, Blood Urea Nitrogen 37H, Creatinine 2.01H, Estimat 

Glomerular Filtration Rate 25, BUN/Creatinine Ratio 18, Glucose Level 737*H, 

Calcium Level 9.3


6/15/18 20:55: 


Sodium Level 134L, Potassium Level 4.7, Chloride Level 103, Carbon Dioxide 

Level 7*L, Anion Gap 24H, Blood Urea Nitrogen 39H, Creatinine 2.05H, Estimat 

Glomerular Filtration Rate 25, BUN/Creatinine Ratio 19, Glucose Level 637*H, 

Calcium Level 8.7


6/15/18 21:05: Glucometer > 600*H


6/15/18 22:00: Glucometer 559*H


6/15/18 23:02: Glucometer 502*H


6/15/18 23:58: Glucometer 476*H


18 01:05: Glucometer 418*H


18 01:21: 


White Blood Count 8.6, Red Blood Count 3.16L, Hemoglobin 9.6L, Hematocrit 28L, 

Mean Corpuscular Volume 88, Mean Corpuscular Hemoglobin 30, Mean Corpuscular 

Hemoglobin Concent 34, Red Cell Distribution Width 12.1, Platelet Count 285, 

Mean Platelet Volume 9.1, Neutrophils (%) (Auto) 69, Lymphocytes (%) (Auto) 23, 

Monocytes (%) (Auto) 8, Eosinophils (%) (Auto) 0, Basophils (%) (Auto) 0, 

Neutrophils # (Auto) 5.9, Lymphocytes # (Auto) 2.0, Monocytes # (Auto) 0.7, 

Eosinophils # (Auto) 0.0, Basophils # (Auto) 0.0, Sodium Level 136, Potassium 

Level 4.5, Chloride Level 108H, Carbon Dioxide Level 14L, Anion Gap 14, Blood 

Urea Nitrogen 39H, Creatinine 1.95H, Estimat Glomerular Filtration Rate 26, BUN/

Creatinine Ratio 20, Glucose Level 379H, Calcium Level 8.5, Phosphorus Level 2.9

, Magnesium Level 2.2


18 02:01: Glucometer 374H


18 02:58: Glucometer 368H


18 04:04: Glucometer 274H


18 04:50: Glucometer 244H


18 06:09: Glucometer 175H


18 06:53: Glucometer 140H


18 07:05: 


Sodium Level 139, Potassium Level 3.7, Chloride Level 112H, Carbon Dioxide 

Level 17L, Anion Gap 10, Blood Urea Nitrogen 33H, Creatinine 1.41H, Estimat 

Glomerular Filtration Rate 38, BUN/Creatinine Ratio 23, Glucose Level 120H, 

Calcium Level 8.5


18 07:32: Glucometer 110


18 08:16: Glucometer 100


18 08:47: Glucometer 90


18 09:11: Glucometer 110


18 10:05: Glucometer 158H


18 11:00: 


Sodium Level 138, Potassium Level 3.9, Chloride Level 108H, Carbon Dioxide 

Level 18L, Anion Gap 12, Blood Urea Nitrogen 31H, Creatinine 1.34H, Estimat 

Glomerular Filtration Rate 40, BUN/Creatinine Ratio 23, Glucose Level 177H, 

Calcium Level 8.5


18 11:28: Glucometer 162H


18 11:58: Glucometer 153H





Physical Exam-([facility])


Physical Exam


Vital Signs





 VS - Last 72 Hours, by Label








 6/15/18 6/15/18 6/15/18 6/15/18





 16:50 18:42 19:00 19:15


 


Temp 97.8 97.8  99.5


 


Pulse 94 94 93 95


 


Resp 20 20  16


 


B/P (MAP) 125/57 (79) 125/57 (79)  114/64 (81)


 


Pulse Ox 97 97  100


 


O2 Delivery    Nasal Cannula


 


O2 Flow Rate    3.00


 


    





 6/15/18 6/15/18 6/15/18 6/15/18





 20:33 21:00 21:50 22:00


 


Pulse  93  94


 


Resp  20  25


 


B/P (MAP)  116/59 (78)  96/53 (67)


 


Pulse Ox  100  99


 


O2 Delivery Nasal Cannula Nasal Cannula Nasal Cannula Nasal Cannula


 


O2 Flow Rate 3.00 3.00 2.00 3.00





 6/15/18 6/15/18 6/16/18 6/16/18





 23:00 23:55 00:00 01:00


 


Temp  99.2  


 


Pulse 94   82


 


Resp 17   


 


B/P (MAP) 108/57 (74)   


 


Pulse Ox 97 96 96 


 


O2 Delivery Nasal Cannula Room Air Room Air 


 


O2 Flow Rate 3.00   


 


    





 6/16/18 6/16/18 6/16/18 6/16/18





 01:00 02:00 03:00 04:00


 


Pulse 84 81 85 84


 


Resp 15 14 16 10


 


B/P (MAP) 98/46 (63) 118/55 (76) 131/62 (85) 108/58 (75)


 


Pulse Ox 99 96 98 97


 


O2 Delivery Nasal Cannula Nasal Cannula Nasal Cannula Nasal Cannula


 


O2 Flow Rate 2.00 2.00 2.00 2.00





 18





 04:05 04:10 05:00 06:00


 


Temp  100.2  


 


Pulse   80 77


 


Resp   16 15


 


B/P (MAP)   122/59 (80) 127/62 (83)


 


Pulse Ox 97  99 99


 


O2 Delivery Nasal Cannula  Nasal Cannula Nasal Cannula


 


O2 Flow Rate 2.00  2.00 2.00


 


    





 18





 06:00 07:00 07:00 08:00


 


Pulse 77 75 75 


 


Resp 15 40  


 


B/P (MAP) 127/62 (83) 115/65 (82)  


 


Pulse Ox 99 99  100


 


O2 Delivery Nasal Cannula Nasal Cannula  Nasal Cannula


 


O2 Flow Rate 2.00 2.00  2.00





 18





 08:00 08:00 09:00 10:00


 


Temp  98.5  


 


Pulse 71  78 75


 


Resp 19  15 11


 


B/P (MAP) 123/65 (84)  123/75 (91) 122/88 (99)


 


Pulse Ox 99  99 99


 


O2 Delivery Nasal Cannula  Nasal Cannula Nasal Cannula


 


O2 Flow Rate 2.00  2.00 2.00


 


    





 18





 11:00 11:59 12:00 12:00


 


Temp  98.0  


 


Pulse 67  75 


 


Resp 14  10 


 


B/P (MAP) 118/59 (78)  121/81 (94) 


 


Pulse Ox 99  98 96


 


O2 Delivery Room Air  Room Air Room Air


 


O2 Flow Rate    





Capillary Refill : Less Than 3 Seconds


General Appearance:  WD/WN, no apparent distress, other (mild ketone odor to 

breath)


HEENT:  PERRL/EOMI


Respiratory:  lungs clear, normal breath sounds


Cardiovascular:  regular rate, rhythm, no edema


Gastrointestinal:  non tender, soft


Extremities:  non-tender, normal inspection


Neurologic/Psychiatric:  alert, normal mood/affect, oriented x 3


Skin:  normal color, warm/dry





Assessment/Plan


Assessment/Plan


Admission Dx


1.  DKA


2.  DM Type 1, uncontrolled


3.  UTI


4.  ROOPA secondary to dehydration


Admission Status:  Inpatient Order (span 2 midnights)


Reason for Inpatient Admission:  


ICU admission for DKA requiring IV insulin drip


Assessment & Plan


1.  DKA


- admitted to ICU for IV insulin drip (drip currently off due to low BS)


- BS now normalized and AG closed


- pt will have  bring insulin pump and will restart sq insulin via pump 

then DC insulin drip





2.  DM Type 1, uncontrolled


- per clinic record pump settings as of 18:


   12am 1.35 U/hr


   7am 1.25 U/hr


   I:C is 1:10


   active insulin time 4 hours


   sensitivity factor 60


   BS goal 115-130





3.  UTI


- pt reports hx of chronic UTI for which she has been on Macrobid


- urine culture pending; currently treated w/ Rocephin





4.  ROOPA secondary to dehydration w/ CKD


- Cr on admission 2.11 -->1.41; continue to monitor





5.  cough


- treated for pneumonia as OP w/ Doxycyline and Symbicort


- no evidence of pneumonia on CXR





Resume home medications for:


HTN


Hypothyroidism


Diabetic Neuropathy





Plan:


Transfer to floor after restarting insulin pump and BS remain stable.





Clinical Quality Measures


DVT/VTE Risk/Contraindication:


Risk Factor Score Per Nursing:  3


RFS Level Per Nursing on Admit:  3=High


Risk Score Comment:  


ED Diaz DO 2018 13:57

## 2018-06-17 VITALS — SYSTOLIC BLOOD PRESSURE: 172 MMHG | DIASTOLIC BLOOD PRESSURE: 75 MMHG

## 2018-06-17 VITALS — DIASTOLIC BLOOD PRESSURE: 79 MMHG | SYSTOLIC BLOOD PRESSURE: 160 MMHG

## 2018-06-17 VITALS — DIASTOLIC BLOOD PRESSURE: 58 MMHG | SYSTOLIC BLOOD PRESSURE: 126 MMHG

## 2018-06-17 VITALS — SYSTOLIC BLOOD PRESSURE: 160 MMHG | DIASTOLIC BLOOD PRESSURE: 79 MMHG

## 2018-06-17 LAB
BASOPHILS # BLD AUTO: 0 10^3/UL (ref 0–0.1)
BASOPHILS NFR BLD AUTO: 0 % (ref 0–10)
BUN/CREAT SERPL: 24
CALCIUM SERPL-MCNC: 8.8 MG/DL (ref 8.5–10.1)
CHLORIDE SERPL-SCNC: 115 MMOL/L (ref 98–107)
CO2 SERPL-SCNC: 21 MMOL/L (ref 21–32)
CREAT SERPL-MCNC: 0.88 MG/DL (ref 0.6–1.3)
EOSINOPHIL # BLD AUTO: 0.1 10^3/UL (ref 0–0.3)
EOSINOPHIL NFR BLD AUTO: 2 % (ref 0–10)
ERYTHROCYTE [DISTWIDTH] IN BLOOD BY AUTOMATED COUNT: 13 % (ref 10–14.5)
GFR SERPLBLD BASED ON 1.73 SQ M-ARVRAT: > 60 ML/MIN
GLUCOSE SERPL-MCNC: 100 MG/DL (ref 70–105)
HCT VFR BLD CALC: 31 % (ref 35–52)
HGB BLD-MCNC: 10.6 G/DL (ref 11.5–16)
LYMPHOCYTES # BLD AUTO: 2.2 X 10^3 (ref 1–4)
LYMPHOCYTES NFR BLD AUTO: 30 % (ref 12–44)
MAGNESIUM SERPL-MCNC: 2.1 MG/DL (ref 1.8–2.4)
MANUAL DIFFERENTIAL PERFORMED BLD QL: NO
MCH RBC QN AUTO: 30 PG (ref 25–34)
MCHC RBC AUTO-ENTMCNC: 34 G/DL (ref 32–36)
MCV RBC AUTO: 87 FL (ref 80–99)
MONOCYTES # BLD AUTO: 0.6 X 10^3 (ref 0–1)
MONOCYTES NFR BLD AUTO: 9 % (ref 0–12)
NEUTROPHILS # BLD AUTO: 4.2 X 10^3 (ref 1.8–7.8)
NEUTROPHILS NFR BLD AUTO: 59 % (ref 42–75)
PHOSPHATE SERPL-MCNC: 3.2 MG/DL (ref 2.3–4.7)
PLATELET # BLD: 278 10^3/UL (ref 130–400)
PMV BLD AUTO: 9.6 FL (ref 7.4–10.4)
POTASSIUM SERPL-SCNC: 3.7 MMOL/L (ref 3.6–5)
RBC # BLD AUTO: 3.59 10^6/UL (ref 4.35–5.85)
SODIUM SERPL-SCNC: 144 MMOL/L (ref 135–145)
WBC # BLD AUTO: 7.2 10^3/UL (ref 4.3–11)

## 2018-06-17 RX ADMIN — ENOXAPARIN SODIUM SCH MG: 100 INJECTION SUBCUTANEOUS at 08:50

## 2018-06-17 RX ADMIN — SODIUM CHLORIDE SCH MLS/HR: 900 INJECTION INTRAVENOUS at 08:49

## 2018-06-17 RX ADMIN — GABAPENTIN SCH MG: 100 CAPSULE ORAL at 08:50

## 2018-06-17 RX ADMIN — LEVOTHYROXINE SODIUM SCH MCG: 125 TABLET ORAL at 05:03

## 2018-06-17 NOTE — DISCHARGE INSTRUCTIONS
Discharge [facility]


Discharge Medications


Changed Medications:  


Insulin Aspart (Novolog) 100 Unit/1 Ml Susp


0 SQ UD, #1 EACH (Changed from: Removed Units; PER INSULIN PUMP)


INSULIN PUMP SETTINGS:


 12am 1.35 U/h


 7am 1.25 U/h


 I:C 1:10


 Active insulin time - 4hr


 sensitivity factor 60


 BS goal 115-130


 


Continued Medications:  


Atorvastatin Calcium (Atorvastatin Calcium) 40 Mg Tablet


40 MG PO HS, TAB





Benzonatate (Benzonatate) 100 Mg Capsule


100-200 MG PO Q8H, CAP





Bupropion HCl (Bupropion Xl) 300 Mg Tab.er.24h


300 MG PO DAILY, TAB





Gabapentin (Gabapentin) 100 Mg Capsule


100 MG PO TID, CAP





Levothyroxine Sodium (Levothyroxine Sodium) 150 Mcg Tablet


250 MCG PO DAILY@0600, TAB





Losartan/Hydrochlorothiazide (Losartan-Hctz 50-12.5 mg Tab) 1 Each Tablet


1 EACH PO DAILY, TAB





Meloxicam (Meloxicam) 15 Mg Tablet


15 MG PO DAILY, TAB





Zolpidem Tartrate (Zolpidem Tartrate) 5 Mg Tablet


5 MG PO HS, TAB





 


Discontinued Medications:  


Budesonide/Formoterol Fumarate (Symbicort 160-4.5 Mcg Inhaler) 10.2 Gm 

Hfa.aer.ad


2 PUFF IH BID, INHALER





Doxycycline Hyclate (Doxycycline Hyclate) 100 Mg Capsule


100 MG PO BID, CAP


filled on 6/14/2018 #20


Metformin HCl (Metformin HCl) 1,000 Mg Tablet


2000 MG PO DAILY, TAB





Nitrofurantoin Macrocrystal (Nitrofurantoin) 100 Mg Capsule


100 MG PO DAILY, CAP











Patient Instructions


Goal/Follow Up Appt:  


[facility] will call to schedule consult w/ Dr. De Guzman for DM.


Patient Instructions:  


Hold Metformin until f/u appointment.


Continue same pump settings.





Activity & Diet


Discharge Diet:  ADA ED Bermudez DO Jun 17, 2018 10:53

## 2018-06-17 NOTE — DISCHARGE SUMMARY
Diagnosis/Chief Complaint


Date of Admission


Yonatan 15, 2018 at 18:04


Date of Discharge


June 17, 2018


Admission Diagnosis


Admission Diagnosis


1.  DKA


2.  DM Type 1, uncontrolled


3.  UTI


4.  ROOPA secondary to dehydration





Discharge Diagnosis


1.  DKA


- admitted to ICU for IV insulin drip (drip currently off due to low BS)


- BS now normalized and AG closed


- pt will have  bring insulin pump and will restart sq insulin via pump 

then DC insulin drip


6/17/18


-RESOLVED; resume on sq insulin pump at usual setting.





2.  DM Type 1, uncontrolled


- per clinic record pump settings as of 4/9/18:


   12am 1.35 U/hr


   7am 1.25 U/hr


   I:C is 1:10


   active insulin time 4 hours


   sensitivity factor 60


   BS goal 115-130


6/17/18


- BS well controlled on usual pump setting


- will schedule consult w/ Dr. Ramos for DM as OP


- recommend holding Metformin until f/u appointment





3.  UTI


- pt reports hx of chronic UTI for which she has been on Macrobid


- urine culture pending; currently treated w/ Rocephin


6/17/18


- urine culture still pending - tx'd w/ Rocephin, no further treatment necessary





4.  ROOPA secondary to dehydration w/ CKD


- Cr on admission 2.11 -->1.41; continue to monitor


6/17/18 - RESOLVED





5.  cough


- treated for pneumonia as OP w/ Doxycyline and Symbicort


- no evidence of pneumonia on CXR





Resume home medications for:


HTN


Hypothyroidism


Diabetic Neuropathy





Plan:


DC home


Will schedule OP consult w/ Dr. Ramos.





Chief Complaint/HPI


Chief Complaint/HPI


This is a 64 yo female patient who sees SOFY Garcia at [facility] for primary care as well 

as Dr. Regan for [facility].  Pt has hx of DM - she reports she was diagnosed 

with DM Type 1 in 1998 at age 43.  She has been on an insulin pump.  She 

reports her last A1c was 9.6 last month.  Patient previously was seeing Dr. Waters but was not able to coordinate her appointments due to work schedule.





Pt reports she was seen at her work medical clinic last Thursday for cough and 

because her  had been diagnosed with pneumonia.  She denied fever but 

states she had blood test that showed pneumonia and she was started on an 

antibiotic (Doxycycline) and Symbicort.  She reports her BS have been elevated 

and she states she was feeling out of it and she did not feel like she could 

work her pump so she stopped her insulin pump.  She was seen at the  ER and 

found to be in DKA w/ AG 24 on admission.





Discharge Summary-Simple/Stand


Consultations





Discharge Physical Examination


Allergies:  


Uncoded Allergies:  


     SULFA (Adverse Reaction, Unknown, 6/15/18)


Vitals & I&Os





Vital Sign - Last 12Hours








  Date Time  Temp Pulse Resp B/P (MAP) Pulse Ox O2 Delivery O2 Flow Rate FiO2


 


6/17/18 10:43 98.7       


 


6/17/18 08:00  81 20 160/79 (106) 96 Room Air  


 


6/16/18 12:00        














Intake and Output 


 


 6/17/18





 00:00


 


Intake Total 2612 ml


 


Output Total 0 ml


 


Balance 2612 ml











Hospital Course


See final discharge diagnosis.





Discharge


Instructions to patient/family


Please see electronic discharge instructions given to patient.





Patient Instructions


Goal/Follow Up Appt:  


[facility] will call to schedule consult w/ Dr. Ramos for DM.


Patient Instructions:  


Hold Metformin until f/u appointment.


Continue same pump settings.





Activity & Diet


Discharge Diet:  ADA Diet


Discharge Medications


Reviewed and agree with Discharge Medication list on patient's Discharge 

Instruction sheet





Discharge Medications


 


Continued Medications:  


Insulin Aspart (Novolog) 100 Unit/1 Ml Susp


0 SQ UD, #1 EACH (Changed from: Removed Units; PER INSULIN PUMP)


INSULIN PUMP SETTINGS:


 12am 1.35 U/h


 7am 1.25 U/h


 I:C 1:10


 Active insulin time - 4hr


 sensitivity factor 60


 BS goal 115-130





Atorvastatin Calcium (Atorvastatin Calcium) 40 Mg Tablet


40 MG PO HS, TAB





Benzonatate (Benzonatate) 100 Mg Capsule


100-200 MG PO Q8H, CAP





Bupropion HCl (Bupropion Xl) 300 Mg Tab.er.24h


300 MG PO DAILY, TAB





Gabapentin (Gabapentin) 100 Mg Capsule


100 MG PO TID, CAP





Levothyroxine Sodium (Levothyroxine Sodium) 150 Mcg Tablet


250 MCG PO DAILY@0600, TAB





Losartan/Hydrochlorothiazide (Losartan-Hctz 50-12.5 mg Tab) 1 Each Tablet


1 EACH PO DAILY, TAB





Meloxicam (Meloxicam) 15 Mg Tablet


15 MG PO DAILY, TAB





Zolpidem Tartrate (Zolpidem Tartrate) 5 Mg Tablet


5 MG PO HS, TAB





 


Discontinued Medications:  


Budesonide/Formoterol Fumarate (Symbicort 160-4.5 Mcg Inhaler) 10.2 Gm 

Hfa.aer.ad


2 PUFF IH BID, INHALER





Doxycycline Hyclate (Doxycycline Hyclate) 100 Mg Capsule


100 MG PO BID, CAP


filled on 6/14/2018 #20





Nitrofurantoin Macrocrystal (Nitrofurantoin) 100 Mg Capsule


100 MG PO DAILY, CAP





Held Medications:


Metformin HCl (Metformin HCl) 1,000 Mg Tablet


2000 MG PO DAILY, TAB


hold until f/u appointment








Clinical Quality Measures


DVT/VTE Risk/Contraindication:


Risk Factor Score Per Nursing:  3


RFS Level Per Nursing on Admit:  3=High


Risk Score Comment:  


Lovenox ordered





Copy


Copies To 1:   LASHAUN RAMOS MD, LINDA K DO Jun 17, 2018 11:00

## 2018-08-06 ENCOUNTER — HOSPITAL ENCOUNTER (OUTPATIENT)
Dept: HOSPITAL 75 - RAD | Age: 63
End: 2018-08-06
Attending: NURSE PRACTITIONER
Payer: COMMERCIAL

## 2018-08-06 DIAGNOSIS — Z12.31: Primary | ICD-10-CM

## 2018-08-06 PROCEDURE — 77067 SCR MAMMO BI INCL CAD: CPT

## 2018-08-07 NOTE — DIAGNOSTIC IMAGING REPORT
INDICATION: Routine screening.



Comparison is made with prior exam from 07/22/2017 06/21/2016.



2-D and 3-D bilateral screening mammography was performed with a

Computer Aided Detection (CAD) system.



FINDINGS: Both breasts are heterogeneously dense, limiting the

sensitivity of mammography. No mass or malignant appearing

microcalcifications are seen. The axillae are unremarkable.



IMPRESSION: No mammographic features suspicious for malignancy

are identified.



ACR BI-RADS Category 1: Negative.

Result letter will be mailed to the patient.

Note:  At least 10% of breast cancer is not imaged by

mammography.



Dictated by: 



  Dictated on workstation # [id number]

## 2019-03-06 ENCOUNTER — HOSPITAL ENCOUNTER (EMERGENCY)
Dept: HOSPITAL 75 - ER | Age: 64
Discharge: HOME | End: 2019-03-06
Payer: COMMERCIAL

## 2019-03-06 VITALS — BODY MASS INDEX: 30.45 KG/M2 | HEIGHT: 67 IN | WEIGHT: 194 LBS

## 2019-03-06 VITALS — DIASTOLIC BLOOD PRESSURE: 66 MMHG | SYSTOLIC BLOOD PRESSURE: 140 MMHG

## 2019-03-06 DIAGNOSIS — Z90.710: ICD-10-CM

## 2019-03-06 DIAGNOSIS — B37.3: ICD-10-CM

## 2019-03-06 DIAGNOSIS — E11.65: Primary | ICD-10-CM

## 2019-03-06 DIAGNOSIS — Z87.891: ICD-10-CM

## 2019-03-06 DIAGNOSIS — Z88.2: ICD-10-CM

## 2019-03-06 DIAGNOSIS — Z79.4: ICD-10-CM

## 2019-03-06 DIAGNOSIS — N39.0: ICD-10-CM

## 2019-03-06 DIAGNOSIS — E03.9: ICD-10-CM

## 2019-03-06 LAB
ALBUMIN SERPL-MCNC: 4 GM/DL (ref 3.2–4.5)
ALP SERPL-CCNC: 109 U/L (ref 40–136)
ALT SERPL-CCNC: 21 U/L (ref 0–55)
APTT PPP: YELLOW S
BACTERIA #/AREA URNS HPF: (no result) /HPF
BASOPHILS # BLD AUTO: 0 10^3/UL (ref 0–0.1)
BASOPHILS NFR BLD AUTO: 0 % (ref 0–10)
BILIRUB SERPL-MCNC: 0.5 MG/DL (ref 0.1–1)
BILIRUB UR QL STRIP: NEGATIVE
BUN/CREAT SERPL: 14
CALCIUM SERPL-MCNC: 9.7 MG/DL (ref 8.5–10.1)
CHLORIDE SERPL-SCNC: 103 MMOL/L (ref 98–107)
CO2 SERPL-SCNC: 23 MMOL/L (ref 21–32)
CREAT SERPL-MCNC: 1.06 MG/DL (ref 0.6–1.3)
EOSINOPHIL # BLD AUTO: 0 10^3/UL (ref 0–0.3)
EOSINOPHIL NFR BLD AUTO: 0 % (ref 0–10)
ERYTHROCYTE [DISTWIDTH] IN BLOOD BY AUTOMATED COUNT: 13.6 % (ref 10–14.5)
FIBRINOGEN PPP-MCNC: (no result) MG/DL
GFR SERPLBLD BASED ON 1.73 SQ M-ARVRAT: 53 ML/MIN
GLUCOSE SERPL-MCNC: 251 MG/DL (ref 70–105)
GLUCOSE UR STRIP-MCNC: (no result) MG/DL
HCT VFR BLD CALC: 35 % (ref 35–52)
HGB BLD-MCNC: 11.3 G/DL (ref 11.5–16)
KETONES UR QL STRIP: (no result)
LEUKOCYTE ESTERASE UR QL STRIP: (no result)
LYMPHOCYTES # BLD AUTO: 1.1 X 10^3 (ref 1–4)
LYMPHOCYTES NFR BLD AUTO: 12 % (ref 12–44)
MAGNESIUM SERPL-MCNC: 1.6 MG/DL (ref 1.8–2.4)
MANUAL DIFFERENTIAL PERFORMED BLD QL: NO
MCH RBC QN AUTO: 29 PG (ref 25–34)
MCHC RBC AUTO-ENTMCNC: 32 G/DL (ref 32–36)
MCV RBC AUTO: 91 FL (ref 80–99)
MONOCYTES # BLD AUTO: 0.9 X 10^3 (ref 0–1)
MONOCYTES NFR BLD AUTO: 10 % (ref 0–12)
NEUTROPHILS # BLD AUTO: 7.2 X 10^3 (ref 1.8–7.8)
NEUTROPHILS NFR BLD AUTO: 79 % (ref 42–75)
NITRITE UR QL STRIP: NEGATIVE
PH UR STRIP: 6 [PH] (ref 5–9)
PHOSPHATE SERPL-MCNC: 3.2 MG/DL (ref 2.3–4.7)
PLATELET # BLD: 234 10^3/UL (ref 130–400)
PMV BLD AUTO: 9.4 FL (ref 7.4–10.4)
POTASSIUM SERPL-SCNC: 4.3 MMOL/L (ref 3.6–5)
PROT SERPL-MCNC: 6.6 GM/DL (ref 6.4–8.2)
PROT UR QL STRIP: (no result)
RBC #/AREA URNS HPF: (no result) /HPF
SODIUM SERPL-SCNC: 134 MMOL/L (ref 135–145)
SP GR UR STRIP: 1.01 (ref 1.02–1.02)
UROBILINOGEN UR-MCNC: NORMAL MG/DL
WBC # BLD AUTO: 9.2 10^3/UL (ref 4.3–11)
WBC #/AREA URNS HPF: (no result) /HPF
YEAST #/AREA URNS HPF: (no result) /HPF

## 2019-03-06 PROCEDURE — 81000 URINALYSIS NONAUTO W/SCOPE: CPT

## 2019-03-06 PROCEDURE — 84100 ASSAY OF PHOSPHORUS: CPT

## 2019-03-06 PROCEDURE — 85025 COMPLETE CBC W/AUTO DIFF WBC: CPT

## 2019-03-06 PROCEDURE — 87088 URINE BACTERIA CULTURE: CPT

## 2019-03-06 PROCEDURE — 87804 INFLUENZA ASSAY W/OPTIC: CPT

## 2019-03-06 PROCEDURE — 83735 ASSAY OF MAGNESIUM: CPT

## 2019-03-06 PROCEDURE — 36415 COLL VENOUS BLD VENIPUNCTURE: CPT

## 2019-03-06 PROCEDURE — 80053 COMPREHEN METABOLIC PANEL: CPT

## 2019-03-06 PROCEDURE — 82962 GLUCOSE BLOOD TEST: CPT

## 2019-03-06 NOTE — ED GENERAL
General


Chief Complaint:  Glucose Problems


Stated Complaint:  DIABETES PROBLEMS,KETO ACIDOSIS


Nursing Triage Note:  


PT TO ROOM 9 CO OF FSBS BEING ELEVATED, PT HAS INSULIN PUMP. DENIES N,V OR 


DIARRHEA


Nursing Sepsis Screen:  No Definite Risk


Source of Information:  Patient, Family


Exam Limitations:  No Limitations





History of Present Illness


Date Seen by Provider:  Mar 6, 2019


Time Seen by Provider:  07:15


Initial Comments


Here with report of ketones in her urine. Patient is diabetic and is on insulin 

pump basal rate of 1.2. She has noted that she's had elevated blood sugars over 

the last day or 2 and relates that to family issues and she is having to take 

care of other people and has neglected herself some. Her significant other has 

pneumonia currently. Patient denies fever, vomiting, dysuria or diarrhea but 

was noted to have mild fever on arrival today. Denies cough or sore throat.


Timing/Duration:  1-3 Hours


Severity:  Moderate


Associated Systoms:  No Chest Pain, No Cough; Fever/Chills; No Nausea/Vomiting, 

No Shortness of Air, No Weakness





Allergies and Home Medications


Allergies


Uncoded Allergies:  


     SULFA (Adverse Reaction, Unknown, 6/15/18)





Home Medications


Atorvastatin Calcium 40 Mg Tablet, 40 MG PO HS, (Reported)


Benzonatate 100 Mg Capsule, 100-200 MG PO Q8H, (Reported)


Bupropion HCl 300 Mg Tab.er.24h, 300 MG PO DAILY, (Reported)


Gabapentin 100 Mg Capsule, 100 MG PO TID, (Reported)


Insulin Aspart 100 Unit/1 Ml Susp, 0 SQ UD


   INSULIN PUMP SETTINGS: 12am 1.35 U/h 7am 1.25 U/h I:C 1:10 Active insulin 

time - 4hr sensitivity factor 60 BS goal 115-130 


   Prescribed by: ED VILLA on 18 1048


Levothyroxine Sodium 150 Mcg Tablet, 250 MCG PO DAILY@0600, (Reported)


Losartan/Hydrochlorothiazide 1 Each Tablet, 1 EACH PO DAILY, (Reported)


Meloxicam 15 Mg Tablet, 15 MG PO DAILY, (Reported)


Zolpidem Tartrate 5 Mg Tablet, 5 MG PO HS, (Reported)





Patient Home Medication List


Home Medication List Reviewed:  Yes





Review of Systems


Review of Systems


Constitutional:  see HPI; No chills; fever; No weakness


EENTM:  no symptoms reported


Respiratory:  No cough, No short of breath


Cardiovascular:  no symptoms reported


Gastrointestinal:  No abdominal pain, No nausea, No vomiting


Genitourinary:  No dysuria, No pain


Musculoskeletal:  no symptoms reported


Skin:  no symptoms reported





All Other Systems Reviewed


Negative Unless Noted:  Yes





Past Medical-Social-Family Hx


Past Med/Social Hx:  Reviewed Nursing Past Med/Soc Hx


Patient Social History


Alcohol Use:  Denies Use


Recreational Drug Use:  No


Smoking Status:  Former Smoker


2nd Hand Smoke Exposure:  No


Recent Foreign Travel:  No


Contact w/Someone Who Travel:  No


Recent Infectious Disease Expo:  No


Recent Hopitalizations:  No





Immunizations Up To Date


PED Vaccines UTD:  No


Date of Pneumonia Vaccine:  2016





Seasonal Allergies


Seasonal Allergies:  No





Past Medical History


Surgeries:  Yes


Hysterectomy


Respiratory:  No


Currently Using CPAP:  No


Currently Using BIPAP:  No


Cardiac:  No


Neurological:  No


Female Reproductive Disorders:  Denies


GYN History:  Hysterectomy


Sexually Transmitted Disease:  No


HIV/AIDS:  No


Genitourinary:  No


Gastrointestinal:  No


Musculoskeletal:  No


Endocrine:  Yes


Diabetes, Insulin dep, Hypothyroidsim


HEENT:  No


Loss of Vision:  Denies


Hearing Impairment:  Denies


Cancer:  No


Psychosocial:  No


Integumentary:  No


Blood Disorders:  No


Adverse Reaction/Blood Tranf:  No





Family Medical History


Reviewed Nursing Family Hx





Degenerative disc disease


  G8 BROTHER, Onset:60 years & older


FHx: rheumatoid arthritis


  G8 BROTHER, Onset:60 years & older


Lymphedema


  19 FATHER, , Age:88, Onset:60 years & older





Physical Exam


Vital Signs





Vital Signs - First Documented








 3/6/19





 07:10


 


Temp 100.4


 


Pulse 88


 


Resp 18


 


B/P (MAP) 143/83 (103)


 


Pulse Ox 97





Capillary Refill : Less Than 3 Seconds


Height, Weight, BMI


Height: 5'7.00"


Weight: 194lbs. 4.8oz. 87.695879nv; 29.8 BMI


Method:Stated


General Appearance:  No Apparent Distress, WD/WN


HEENT:  PERRL/EOMI, TMs Normal, Pharynx Normal


Neck:  Non Tender, Supple


Respiratory:  Lungs Clear, Normal Breath Sounds


Cardiovascular:  Regular Rate, Rhythm, No Murmur


Gastrointestinal:  Non Tender, Soft


Back:  Normal Inspection, No CVA Tenderness, No Vertebral Tenderness


Extremity:  Normal Range of Motion, Non Tender


Neurologic/Psychiatric:  Alert, Oriented x3


Skin:  Normal Color, Warm/Dry





Progress/Results/Core Measures


Suspected Sepsis


Recent Fever Within 48 Hours:  No


Infection Criteria Present:  None


New/Unexplained  Altered Menta:  No


Sepsis Screen:  No Definite Risk


SIRS


Temperature:100.4 


Pulse: 88 


Respiratory Rate: 18


 


Laboratory Tests


3/6/19 07:25: White Blood Count 9.2


Blood Pressure 143 /83 


Mean: 103


 


Laboratory Tests


3/6/19 07:25: 


Creatinine 1.06, Platelet Count 234, Total Bilirubin 0.5








Results/Orders


Lab Results





Laboratory Tests








Test


 3/6/19


07:22 3/6/19


07:25 3/6/19


07:27 Range/Units


 


 


Glucometer 251 H     MG/DL


 


White Blood Count


 


 9.2 


 


 4.3-11.0


10^3/uL


 


Red Blood Count


 


 3.89 L


 


 4.35-5.85


10^6/uL


 


Hemoglobin  11.3 L  11.5-16.0  G/DL


 


Hematocrit  35   35-52  %


 


Mean Corpuscular Volume  91   80-99  FL


 


Mean Corpuscular Hemoglobin  29   25-34  PG


 


Mean Corpuscular Hemoglobin


Concent 


 32 


 


 32-36  G/DL





 


Red Cell Distribution Width  13.6   10.0-14.5  %


 


Platelet Count


 


 234 


 


 130-400


10^3/uL


 


Mean Platelet Volume  9.4   7.4-10.4  FL


 


Neutrophils (%) (Auto)  79 H  42-75  %


 


Lymphocytes (%) (Auto)  12   12-44  %


 


Monocytes (%) (Auto)  10   0-12  %


 


Eosinophils (%) (Auto)  0   0-10  %


 


Basophils (%) (Auto)  0   0-10  %


 


Neutrophils # (Auto)  7.2   1.8-7.8  X 10^3


 


Lymphocytes # (Auto)  1.1   1.0-4.0  X 10^3


 


Monocytes # (Auto)  0.9   0.0-1.0  X 10^3


 


Eosinophils # (Auto)


 


 0.0 


 


 0.0-0.3


10^3/uL


 


Basophils # (Auto)


 


 0.0 


 


 0.0-0.1


10^3/uL


 


Sodium Level  134 L  135-145  MMOL/L


 


Potassium Level  4.3   3.6-5.0  MMOL/L


 


Chloride Level  103     MMOL/L


 


Carbon Dioxide Level  23   21-32  MMOL/L


 


Anion Gap  8   5-14  MMOL/L


 


Blood Urea Nitrogen  15   7-18  MG/DL


 


Creatinine


 


 1.06 


 


 0.60-1.30


MG/DL


 


Estimat Glomerular Filtration


Rate 


 53 


 


  





 


BUN/Creatinine Ratio  14    


 


Glucose Level  251 H    MG/DL


 


Calcium Level  9.7   8.5-10.1  MG/DL


 


Corrected Calcium  9.7   8.5-10.1  MG/DL


 


Phosphorus Level  3.2   2.3-4.7  MG/DL


 


Magnesium Level  1.6 L  1.8-2.4  MG/DL


 


Total Bilirubin  0.5   0.1-1.0  MG/DL


 


Aspartate Amino Transf


(AST/SGOT) 


 16 


 


 5-34  U/L





 


Alanine Aminotransferase


(ALT/SGPT) 


 21 


 


 0-55  U/L





 


Alkaline Phosphatase  109     U/L


 


Total Protein  6.6   6.4-8.2  GM/DL


 


Albumin  4.0   3.2-4.5  GM/DL


 


Urine Color   YELLOW   


 


Urine Clarity   CLOUDY   


 


Urine pH   6  5-9  


 


Urine Specific Gravity   1.010 L 1.016-1.022  


 


Urine Protein   2+ H NEGATIVE  


 


Urine Glucose (UA)   3+ H NEGATIVE  


 


Urine Ketones   1+ H NEGATIVE  


 


Urine Nitrite   NEGATIVE  NEGATIVE  


 


Urine Bilirubin   NEGATIVE  NEGATIVE  


 


Urine Urobilinogen   NORMAL  NORMAL  MG/DL


 


Urine Leukocyte Esterase   3+ H NEGATIVE  


 


Urine RBC (Auto)   2+ H NEGATIVE  


 


Urine RBC   5-10 H  /HPF


 


Urine WBC    H  /HPF


 


Urine Squamous Epithelial


Cells 


 


 10-25 H


  /HPF





 


Urine Crystals   NONE   /LPF


 


Urine Bacteria   FEW H  /HPF


 


Urine Casts   NONE   /LPF


 


Urine Mucus   SMALL H  /LPF


 


Urine Yeast   FEW H  /HPF


 


Urine Culture Indicated   YES   








Micro Results





Microbiology


3/6/19 Influenza Types A,B Antigen (CASANDRA) - Final, Complete


         





My Orders





Orders - TYE MORALES MD


Cbc With Automated Diff (3/6/19 06:33)


Comprehensive Metabolic Panel (3/6/19 06:33)


Magnesium (3/6/19 06:33)


Ua Culture If Indicated (3/6/19 06:33)


Phosphorus (3/6/19 06:33)


Saline Lock/Iv-Start (3/6/19 06:33)


Ns Iv 1000 Ml (Sodium Chloride 0.9%) (3/6/19 06:33)


Accucheck Stat ONCE (3/6/19 06:33)


Influenza A And B Antigens (3/6/19 07:15)


Urine Culture (3/6/19 07:27)


Ceftriaxone  For Iv Use (Rocephin  For I (3/6/19 08:30)


Fluconazole Tablet (Ed Only) (Diflucan T (3/6/19 08:18)


Ns Iv 1000 Ml (Sodium Chloride 0.9%) (3/6/19 08:18)





Medications Given in ED





Current Medications








 Medications  Dose


 Ordered  Sig/Casey


 Route  Start Time


 Stop Time Status Last Admin


Dose Admin


 


 Ceftriaxone


 Sodium 1000 mg/


 Sterile Water  10 ml @ 


 200 mls/hr  ONCE  ONCE


 IV  3/6/19 08:30


 3/6/19 08:32 DC 3/6/19 08:35


200 MLS/HR


 


 Sodium Chloride  1,000 ml @ 


 0 mls/hr  Q0M ONCE


 IV  3/6/19 06:33


 3/6/19 06:36 DC 3/6/19 07:29


1,000 MLS/HR








Vital Signs/I&O











 3/6/19





 07:10


 


Temp 100.4


 


Pulse 88


 


Resp 18


 


B/P (MAP) 143/83 (103)


 


Pulse Ox 97





Capillary Refill : Less Than 3 Seconds








Blood Pressure Mean:  103











Point of Care Testing


Finger Stick Blood Glucose:  251


Blood Glucose Action Taken:  REPORTED TO DR Miles Note :  


Progress Note


Seen and evaluated. IV, labs, UA, normal saline 1 L bolus ordered. Fingerstick 

blood sugar ordered. Blood sugar noted to be 251. We will check influenza 

screen. Monitor patient. Repeat normal saline 1 L bolus. Rocephin 1 g IV given 

due to complexity of her situation with her diabetes and because urinary tract 

infection was found. Also noted yeast infection. Diflucan 150 mg by mouth 

given. Monitor patient. 1000: Patient doing much better. We will continue 

outpatient treatment of her urinary tract infection. Patient will do insulin 

corrections on her pump. Follow-up with her doctor instructed. Discharged home 

with return precautions. Patient verbalize understanding instructions and 

agreement with plan.





Departure


Impression





 Primary Impression:  


 Hyperglycemia


 Additional Impressions:  


 Urinary tract infection


 Qualified Codes:  N30.00 - Acute cystitis without hematuria


 Yeast vaginitis


Disposition:   HOME, SELF-CARE


Condition:  Improved





Departure-Patient Inst.


Decision time for Depature:  10:06


Referrals:  


IBAN SALAMANCA DO (PCP/Family)


Primary Care Physician


Patient Instructions:  Diabetes Type 1, Adult (DC)





Add. Discharge Instructions:  


All discharge instructions reviewed with patient and/or family. Voiced 

understanding.





Is very important that you continue to monitor your sugars and make insulin 

corrections as prescribed. Drink plenty of fluids. You need to get some rest. 

You do have a urinary tract infection and we will treat that with antibiotics 

as discussed. He also had a yeast infection. We did give you a one-time dose of 

Diflucan which should help but you can sometimes have continuation of yeast 

infection. If this happens, you may use the over-the-counter Monistat cream to 

complete the treatment. Follow-up with your Dr. in a few days for recheck. 

Return for worse pain, fever, vomiting, weakness, breathing problems or other 

concerns as needed.


Scripts


Cephalexin (Cephalexin) 500 Mg Tablet


500 MG PO BID, #12 TAB 0 Refills


   Prov: TYE MORALES MD         3/6/19











TYE MORALES MD Mar 6, 2019 07:43

## 2019-03-19 ENCOUNTER — HOSPITAL ENCOUNTER (OUTPATIENT)
Dept: HOSPITAL 75 - PREOP | Age: 64
Discharge: HOME | End: 2019-03-19
Attending: SURGERY
Payer: COMMERCIAL

## 2019-03-19 VITALS — HEIGHT: 67 IN | BODY MASS INDEX: 30.45 KG/M2 | WEIGHT: 194 LBS

## 2019-03-19 DIAGNOSIS — Z01.818: Primary | ICD-10-CM

## 2019-03-26 ENCOUNTER — HOSPITAL ENCOUNTER (OUTPATIENT)
Dept: HOSPITAL 75 - ENDO | Age: 64
Discharge: HOME | End: 2019-03-26
Attending: SURGERY
Payer: COMMERCIAL

## 2019-03-26 VITALS — SYSTOLIC BLOOD PRESSURE: 142 MMHG | DIASTOLIC BLOOD PRESSURE: 79 MMHG

## 2019-03-26 VITALS — DIASTOLIC BLOOD PRESSURE: 81 MMHG | SYSTOLIC BLOOD PRESSURE: 170 MMHG

## 2019-03-26 VITALS — DIASTOLIC BLOOD PRESSURE: 70 MMHG | SYSTOLIC BLOOD PRESSURE: 155 MMHG

## 2019-03-26 VITALS — WEIGHT: 194 LBS | BODY MASS INDEX: 30.45 KG/M2 | HEIGHT: 67 IN

## 2019-03-26 VITALS — SYSTOLIC BLOOD PRESSURE: 155 MMHG | DIASTOLIC BLOOD PRESSURE: 70 MMHG

## 2019-03-26 DIAGNOSIS — Z87.891: ICD-10-CM

## 2019-03-26 DIAGNOSIS — Z79.4: ICD-10-CM

## 2019-03-26 DIAGNOSIS — Z12.11: Primary | ICD-10-CM

## 2019-03-26 DIAGNOSIS — Z79.899: ICD-10-CM

## 2019-03-26 DIAGNOSIS — E11.9: ICD-10-CM

## 2019-03-26 PROCEDURE — 82962 GLUCOSE BLOOD TEST: CPT
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
## 2019-03-26 NOTE — NUR
HAS BEEN ALERT, CHEERFUL, TAKING PO FLUIDS WITHOUT PROBLEM AND DENIES COMPLAINTS THROUGHOUT 
RECOVERY.  REQUESTING DISMISSAL.
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
## 2019-03-26 NOTE — XMS REPORT
Continuity of Care Document

 Created on: 2019



MELANI HARMON

External Reference #: [id number]

: 1955

Sex: Female



Demographics







 Address  [address] [place]  [address]

 

 Home Phone  [phone] x

 

 Preferred Language  Unknown

 

 Marital Status  Unknown

 

 Anabaptist Affiliation  Unknown

 

 Race  Unknown

 

 Ethnic Group  Unknown





Author







 Author  [facility]

 

 Address  Unknown

 

 Phone  Unavailable



              



Allergies

      





 Active            Description            Code            Type            
Severity            Reaction            Onset            Reported/Identified   
         Relationship to Patient            Clinical Status        

 

 Yes            SULFA            SULFA                         Unknown         
   N/A                         06/15/2018                                  

 

 Yes            Sulfa (Sulfonamide Antibiotics)            [id number]          
  Drug Allergy            Unknown            N/A                         2019                                  



                    



Medications

      



There is no data.                  



Problems

      





 Date Dx Coded            Attending            Type            Code            
Diagnosis            Diagnosed By        

 

 2011                         Ot            251.0                        
          

 

 2011                         Ot            251.2                        
          

 

 2011                         Ot            V58.67                       
           

 

 2013            MERCY ARCOS MD                         244.9         
   HYPOTHYROIDISM                     

 

 2013            MERCY ARCOS MD                         250.02        
    DIABETES MELLITUS TYPE 2 - UNCOMPLICATED, UNCONTROLLED                     

 

 2013            MERCY ARCOS MD                         796.2         
   Blood Pressure Isolated Elevated                     

 

 2013            MERCY ARCOS MD                         V07.4         
   taking female hormones for postmenopausal HRT                     

 

 2013                                      244.9            
HYPOTHYROIDISM                     

 

 2013                                      250.02            DIABETES 
MELLITUS TYPE 2 - UNCOMPLICATED, UNCONTROLLED                     

 

 2013                                      796.2            Blood 
Pressure Isolated Elevated                     

 

 2013                                      V07.4            taking female 
hormones for postmenopausal HRT                     

 

 2013            AV CRAIG MD                         244.9   
         HYPOTHYROIDISM                     

 

 2013            AV CRAIG MD                         250.02  
          DIABETES MELLITUS TYPE 2 - UNCOMPLICATED, UNCONTROLLED               
      

 

 2013            AV CRAIG MD                         796.2   
         Blood Pressure Isolated Elevated                     

 

 2013            AV CRAIG MD                         V07.4   
         taking female hormones for postmenopausal HRT                     

 

 2013                                      401.1            ESSENTIAL 
HYPERTENSION BENIGN                     

 

 2013            AV CRAIG MD                         401.1   
         ESSENTIAL HYPERTENSION BENIGN                     

 

 2013            AV CRAIG MD                         682.2   
         SKIN ABSCESS OF THE TRUNK - GROIN                     

 

 2015                         Ot            793.80                       
           

 

 2015            IBAN SALAMANCA DO            Ot            V76.12      
                            

 

 2015            COSENS DO, IBAN L            Ot            V76.12      
                            

 

 2016            COSENS DO, IBAN L            Ot            V76.12      
      OTH SCREEN MAMMO-MALIGN NEOPLASM OF GUZMAN                     

 

 2016            MALORIE SEO            Ot            Z12.31   
         ENCNTR SCREEN MAMMOGRAM FOR MALIGNANT NE                     

 

 2016            MALORIE SEO            Ot            Z12.31   
         ENCNTR SCREEN MAMMOGRAM FOR MALIGNANT NE                     

 

 2017            COSENS DO, IBAN L            Ot            V76.12      
      OTH SCREEN MAMMO-MALIGN NEOPLASM OF GUZMAN                     

 

 2017            MALORIE SEO            Ot            Z12.31   
         ENCNTR SCREEN MAMMOGRAM FOR MALIGNANT NE                     

 

 2017            ITZEL NEWSOME APRN            Ot            Z12.31    
        ENCNTR SCREEN MAMMOGRAM FOR MALIGNANT NE                     

 

 2017            ITZEL NEWSOME            Ot            Z12.31    
        ENCNTR SCREEN MAMMOGRAM FOR MALIGNANT NE                     

 

 06/15/2018            ITZEL NEWSOME APRN            Ot            Z12.31    
        ENCNTR SCREEN MAMMOGRAM FOR MALIGNANT NE                     

 

 2018            VILLA DO, ED K            Ot            E10.10        
    TYPE 1 DIABETES MELLITUS WITH KETOACIDOS                     

 

 2018            VILLA DO, ED K            Ot            E10.22        
    TYPE 1 DIABETES MELLITUS W DIABETIC                      

 

 2018            VILLA DO, ED K            Ot            E10.40        
    TYPE 1 DIABETES MELLITUS WITH DIABETIC N                     

 

 2018            VILLA DO, ED K            Ot            E78.5         
   HYPERLIPIDEMIA, UNSPECIFIED                     

 

 2018            VILLA DO, ED K            Ot            E86.0         
   DEHYDRATION                     

 

 2018            VILLA DO, ED K            Ot            I12.9         
   HYPERTENSIVE CHRONIC KIDNEY DISEASE W ST                     

 

 2018            VILLA DO, ED K            Ot            M17.0         
   BILATERAL PRIMARY OSTEOARTHRITIS OF KNEE                     

 

 2018            VILLA DO, ED K            Ot            N17.9         
   ACUTE KIDNEY FAILURE, UNSPECIFIED                     

 

 2018            VILLA DO, ED K            Ot            N18.9         
   CHRONIC KIDNEY DISEASE, UNSPECIFIED                     

 

 2018            VILLA DO, ED K            Ot            N39.0         
   URINARY TRACT INFECTION, SITE NOT SPECIF                     

 

 2018            VILLA DO, ED K            Ot            R05            
COUGH                     

 

 2018            VILLA DO, ED K            Ot            Z79.4         
   LONG TERM (CURRENT) USE OF INSULIN                     

 

 2019            ITZEL NEWSOME APRN            Ot            Z12.31    
        ENCNTR SCREEN MAMMOGRAM FOR MALIGNANT NE                     

 

 2019            KRISTI JIMENES VIDA            Ot            Z12.31     
       ENCNTR SCREEN MAMMOGRAM FOR MALIGNANT NE                     

 

 2019            TYE MORALES MD, Ot            B37.3   
         CANDIDIASIS OF VULVA AND VAGINA                     

 

 2019            TYE MORALES MD            Ot            E03.9   
         HYPOTHYROIDISM, UNSPECIFIED                     

 

 2019            TYE MORALES MD            Ot            E11.65  
          TYPE 2 DIABETES MELLITUS WITH HYPERGLYCE                     

 

 2019            TYE MORALES MD            Ot            N39.0   
         URINARY TRACT INFECTION, SITE NOT SPECIF                     

 

 2019            TYE MORALES MD            Ot            R82.998 
           OTHER ABNORMAL FINDINGS IN URINE                     

 

 2019            TYE MORALES MD            Ot            Z79.4   
         LONG TERM (CURRENT) USE OF INSULIN                     

 

 2019            TYE MORALES MD            Ot            Z87.891 
           PERSONAL HISTORY OF NICOTINE DEPENDENCE                     

 

 2019            TYE MORALES MD            Ot            Z88.2   
         ALLERGY STATUS TO SULFONAMIDES STATUS                     

 

 2019            TYE MORALES MD            Ot            Z90.710 
           ACQUIRED ABSENCE OF BOTH CERVIX AND UTER                     

 

 2019            TYE MORALES MD            Ot            B37.3   
         CANDIDIASIS OF VULVA AND VAGINA                     

 

 2019            TYE MORALES MD            Ot            E03.9   
         HYPOTHYROIDISM, UNSPECIFIED                     

 

 2019            TYE MORALES MD            Ot            E11.65  
          TYPE 2 DIABETES MELLITUS WITH HYPERGLYCE                     

 

 2019            TYE MORALES MD            Ot            N39.0   
         URINARY TRACT INFECTION, SITE NOT SPECIF                     

 

 2019            TYE MORALES MD            Ot            R82.998 
           OTHER ABNORMAL FINDINGS IN URINE                     

 

 2019            TYE MORALES MD            Ot            Z79.4   
         LONG TERM (CURRENT) USE OF INSULIN                     

 

 2019            TYE MORALES MD            Ot            Z87.891 
           PERSONAL HISTORY OF NICOTINE DEPENDENCE                     

 

 2019            TYE MORALES MD            Ot            Z88.2   
         ALLERGY STATUS TO SULFONAMIDES STATUS                     

 

 2019            TYE MORALES MD            Ot            Z90.710 
           ACQUIRED ABSENCE OF BOTH CERVIX AND UTER                     

 

 2019            TYE MORALES MD            Ot            B37.3   
         CANDIDIASIS OF VULVA AND VAGINA                     

 

 2019            TYE MORALES MD            Ot            E03.9   
         HYPOTHYROIDISM, UNSPECIFIED                     

 

 2019            TYE MORALES MD            Ot            E11.65  
          TYPE 2 DIABETES MELLITUS WITH HYPERGLYCE                     

 

 2019            TYE MORALES MD, Ot            N39.0   
         URINARY TRACT INFECTION, SITE NOT SPECIF                     

 

 2019            TYE MORALES MD            Ot            R82.998 
           OTHER ABNORMAL FINDINGS IN URINE                     

 

 2019            TYE MORALES MD            Ot            Z79.4   
         LONG TERM (CURRENT) USE OF INSULIN                     

 

 2019            TYE MORALES MD            Ot            Z87.891 
           PERSONAL HISTORY OF NICOTINE DEPENDENCE                     

 

 2019            TYE MORALES MD            Ot            Z88.2   
         ALLERGY STATUS TO SULFONAMIDES STATUS                     

 

 2019            TYE MORALES MD            Ot            Z90.710 
           ACQUIRED ABSENCE OF BOTH CERVIX AND UTER                     

 

 2019            VIRGINIA GARCIA DO            Ot            Z01.818     
       ENCOUNTER FOR OTHER PREPROCEDURAL EXAMIN                     



                                                                               
                                                                               



Procedures

      





 Code            Description            Performed By            Performed On   
     

 

             [id number]                                  A1C (IN-HOUSE)             
                      2013        

 

             [id number]                                  CULTURE MRSA               
                    2013        



                    



Results

      





 Test            Result            Range        









 Complete urinalysis with reflex to culture - 06/15/18 17:00         









 Urine color determination            YELLOW             NRG        

 

 Urine clarity determination            SLIGHTLY CLOUDY             NRG        

 

 Urine pH measurement by test strip            5             5-9        

 

 Specific gravity of urine by test strip            1.015             1.016-
1.022        

 

 Urine protein assay by test strip, semi-quantitative            NEGATIVE      
       NEGATIVE        

 

 Urine glucose detection by automated test strip            4+             
NEGATIVE        

 

 Erythrocytes detection in urine sediment by light microscopy            
NEGATIVE             NEGATIVE        

 

 Urine ketones detection by automated test strip            4+             
NEGATIVE        

 

 Urine nitrite detection by test strip            NEGATIVE             NEGATIVE
        

 

 Urine total bilirubin detection by test strip            NEGATIVE             
NEGATIVE        

 

 Urine urobilinogen measurement by automated test strip (mass/volume)          
  NORMAL             NORMAL        

 

 Urine leukocyte esterase detection by dipstick            3+             
NEGATIVE        

 

 Automated urine sediment erythrocyte count by microscopy (number/high power 
field)            NONE             NRG        

 

 Automated urine sediment leukocyte count by microscopy (number/high power field
)             [HPF]            NRG        

 

 Bacteria detection in urine sediment by light microscopy            FEW       
      NRG        

 

 Squamous epithelial cells detection in urine sediment by light microscopy     
       10-25             NRG        

 

 Crystals detection in urine sediment by light microscopy            NONE      
       NRG        

 

 Casts detection in urine sediment by light microscopy            NONE         
    NRG        

 

 Mucus detection in urine sediment by light microscopy            NEGATIVE     
        NRG        

 

 Complete urinalysis with reflex to culture            YES             NRG     
   









 Bacterial urine culture - 06/15/18 17:00         









 Bacterial urine culture            SEE COMMEN             NR        

 

 COLONY COUNT            .             NRG        









 Complete blood count (CBC) with automated white blood cell (WBC) differential 
- 06/15/18 17:05         









 Blood leukocytes automated count (number/volume)            8.4 10*3/uL       
     4.3-11.0        

 

 Blood erythrocytes automated count (number/volume)            3.69 10*6/uL    
        4.35-5.85        

 

 Venous blood hemoglobin measurement (mass/volume)            11.2 g/dL        
    11.5-16.0        

 

 Blood hematocrit (volume fraction)            33 %            35-52        

 

 Automated erythrocyte mean corpuscular volume            89 [foz_us]          
  80-99        

 

 Automated erythrocyte mean corpuscular hemoglobin (mass per erythrocyte)      
      30 pg            25-34        

 

 Automated erythrocyte mean corpuscular hemoglobin concentration measurement (
mass/volume)            34 g/dL            32-36        

 

 Automated erythrocyte distribution width ratio            12.3 %            
10.0-14.5        

 

 Automated blood platelet count (count/volume)            330 10*3/uL          
  130-400        

 

 Automated blood platelet mean volume measurement            10.0 [foz_us]     
       7.4-10.4        

 

 Automated blood neutrophils/100 leukocytes            83 %            42-75   
     

 

 Automated blood lymphocytes/100 leukocytes            12 %            12-44   
     

 

 Blood monocytes/100 leukocytes            4 %            0-12        

 

 Automated blood eosinophils/100 leukocytes            0 %            0-10     
   

 

 Automated blood basophils/100 leukocytes            1 %            0-10        

 

 Blood neutrophils automated count (number/volume)            7.0 10*3         
   1.8-7.8        

 

 Blood lymphocytes automated count (number/volume)            1.0 10*3         
   1.0-4.0        

 

 Blood monocytes automated count (number/volume)            0.3 10*3            
0.0-1.0        

 

 Automated eosinophil count            0.0 10*3/uL            0.0-0.3        

 

 Automated blood basophil count (count/volume)            0.0 10*3/uL          
  0.0-0.1        









 Comprehensive metabolic panel - 06/15/18 17:05         









 Serum or plasma sodium measurement (moles/volume)            132 mmol/L       
     135-145        

 

 Serum or plasma potassium measurement (moles/volume)            5.4 mmol/L    
        3.6-5.0        

 

 Serum or plasma chloride measurement (moles/volume)            96 mmol/L      
              

 

 Carbon dioxide            12 mmol/L            21-32        

 

 Serum or plasma anion gap determination (moles/volume)            24 mmol/L   
         5-14        

 

 Serum or plasma urea nitrogen measurement (mass/volume)            38 mg/dL   
         7-18        

 

 Serum or plasma creatinine measurement (mass/volume)            2.11 mg/dL    
        0.60-1.30        

 

 Serum or plasma urea nitrogen/creatinine mass ratio            18             
NRG        

 

 Serum or plasma creatinine measurement with calculation of estimated 
glomerular filtration rate            24             NRG        

 

 Serum or plasma glucose measurement (mass/volume)            770 mg/dL        
            

 

 Serum or plasma calcium measurement (mass/volume)            9.8 mg/dL        
    8.5-10.1        

 

 Serum or plasma total bilirubin measurement (mass/volume)            0.4 mg/dL
            0.1-1.0        

 

 Serum or plasma alkaline phosphatase measurement (enzymatic activity/volume)  
          121 U/L                    

 

 Serum or plasma aspartate aminotransferase measurement (enzymatic activity/
volume)            17 U/L            5-34        

 

 Serum or plasma alanine aminotransferase measurement (enzymatic activity/volume
)            28 U/L            0-55        

 

 Serum or plasma protein measurement (mass/volume)            7.0 g/dL         
   6.4-8.2        

 

 Serum or plasma albumin measurement (mass/volume)            4.3 g/dL         
   3.2-4.5        









 Serum or plasma troponin i.cardiac measurement (mass/volume) - 06/15/18 17:05 
        









 Serum or plasma troponin i.cardiac measurement (mass/volume)            < ng/
mL            <0.30        









 Capillary blood glucose measurement by glucometer (mass/volume) - 06/15/18 17:
27         









 Capillary blood glucose measurement by glucometer (mass/volume)            > mg
/dL                    









 Capillary blood glucose measurement by glucometer (mass/volume) - 06/15/18 18:
40         









 Capillary blood glucose measurement by glucometer (mass/volume)            583 
mg/dL                    









 Methicillin resistant Staphylococcus aureus (MRSA) screening culture - 06/15/
18 19:00         









 Methicillin resistant Staphylococcus aureus (MRSA) screening culture          
  NEG             NRG        









 Capillary blood glucose measurement by glucometer (mass/volume) - 06/15/18 19:
01         









 Capillary blood glucose measurement by glucometer (mass/volume)            > mg
/dL                    









 Whole blood basic metabolic panel - 06/15/18 19:20         









 Serum or plasma sodium measurement (moles/volume)            132 mmol/L       
     135-145        

 

 Serum or plasma potassium measurement (moles/volume)            6.2 mmol/L    
        3.6-5.0        

 

 Serum or plasma chloride measurement (moles/volume)            100 mmol/L     
               

 

 Carbon dioxide            7 mmol/L            -32        

 

 Serum or plasma anion gap determination (moles/volume)            25 mmol/L   
         5-14        

 

 Serum or plasma urea nitrogen measurement (mass/volume)            37 mg/dL   
         7-18        

 

 Serum or plasma creatinine measurement (mass/volume)            2.01 mg/dL    
        0.60-1.30        

 

 Serum or plasma urea nitrogen/creatinine mass ratio            18             
NRG        

 

 Serum or plasma creatinine measurement with calculation of estimated 
glomerular filtration rate            25             NRG        

 

 Serum or plasma glucose measurement (mass/volume)            737 mg/dL        
            

 

 Serum or plasma calcium measurement (mass/volume)            9.3 mg/dL        
    8.5-10.1        









 Whole blood basic metabolic panel - 06/15/18 20:55         









 Serum or plasma sodium measurement (moles/volume)            134 mmol/L       
     135-145        

 

 Serum or plasma potassium measurement (moles/volume)            4.7 mmol/L    
        3.6-5.0        

 

 Serum or plasma chloride measurement (moles/volume)            103 mmol/L     
               

 

 Carbon dioxide            7 mmol/L            -32        

 

 Serum or plasma anion gap determination (moles/volume)            24 mmol/L   
         5-14        

 

 Serum or plasma urea nitrogen measurement (mass/volume)            39 mg/dL   
         7-18        

 

 Serum or plasma creatinine measurement (mass/volume)            2.05 mg/dL    
        0.60-1.30        

 

 Serum or plasma urea nitrogen/creatinine mass ratio            19             
NRG        

 

 Serum or plasma creatinine measurement with calculation of estimated 
glomerular filtration rate            25             NRG        

 

 Serum or plasma glucose measurement (mass/volume)            637 mg/dL        
            

 

 Serum or plasma calcium measurement (mass/volume)            8.7 mg/dL        
    8.5-10.1        









 Capillary blood glucose measurement by glucometer (mass/volume) - 06/15/18 21:
05         









 Capillary blood glucose measurement by glucometer (mass/volume)            > mg
/dL                    









 Capillary blood glucose measurement by glucometer (mass/volume) - 06/15/18 22:
00         









 Capillary blood glucose measurement by glucometer (mass/volume)            559 
mg/dL                    









 Capillary blood glucose measurement by glucometer (mass/volume) - 06/15/18 23:
02         









 Capillary blood glucose measurement by glucometer (mass/volume)            502 
mg/dL                    









 Capillary blood glucose measurement by glucometer (mass/volume) - 06/15/18 23:
58         









 Capillary blood glucose measurement by glucometer (mass/volume)            476 
mg/dL                    









 Capillary blood glucose measurement by glucometer (mass/volume) - 18 01:
05         









 Capillary blood glucose measurement by glucometer (mass/volume)            418 
mg/dL                    









 Complete blood count (CBC) with automated white blood cell (WBC) differential 
- 18 01:21         









 Blood leukocytes automated count (number/volume)            8.6 10*3/uL       
     4.3-11.0        

 

 Blood erythrocytes automated count (number/volume)            3.16 10*6/uL    
        4.35-5.85        

 

 Venous blood hemoglobin measurement (mass/volume)            9.6 g/dL         
   11.5-16.0        

 

 Blood hematocrit (volume fraction)            28 %            35-52        

 

 Automated erythrocyte mean corpuscular volume            88 [foz_us]          
  80-99        

 

 Automated erythrocyte mean corpuscular hemoglobin (mass per erythrocyte)      
      30 pg            25-34        

 

 Automated erythrocyte mean corpuscular hemoglobin concentration measurement (
mass/volume)            34 g/dL            32-36        

 

 Automated erythrocyte distribution width ratio            12.1 %            
10.0-14.5        

 

 Automated blood platelet count (count/volume)            285 10*3/uL          
  130-400        

 

 Automated blood platelet mean volume measurement            9.1 [foz_us]      
      7.4-10.4        

 

 Automated blood neutrophils/100 leukocytes            69 %            42-75   
     

 

 Automated blood lymphocytes/100 leukocytes            23 %            12-44   
     

 

 Blood monocytes/100 leukocytes            8 %            0-12        

 

 Automated blood eosinophils/100 leukocytes            0 %            0-10     
   

 

 Automated blood basophils/100 leukocytes            0 %            0-10        

 

 Blood neutrophils automated count (number/volume)            5.9 10*3         
   1.8-7.8        

 

 Blood lymphocytes automated count (number/volume)            2.0 10*3         
   1.0-4.0        

 

 Blood monocytes automated count (number/volume)            0.7 10*3            
0.0-1.0        

 

 Automated eosinophil count            0.0 10*3/uL            0.0-0.3        

 

 Automated blood basophil count (count/volume)            0.0 10*3/uL          
  0.0-0.1        









 Whole blood basic metabolic panel - 18 01:21         









 Serum or plasma sodium measurement (moles/volume)            136 mmol/L       
     135-145        

 

 Serum or plasma potassium measurement (moles/volume)            4.5 mmol/L    
        3.6-5.0        

 

 Serum or plasma chloride measurement (moles/volume)            108 mmol/L     
               

 

 Carbon dioxide            14 mmol/L            21-32        

 

 Serum or plasma anion gap determination (moles/volume)            14 mmol/L   
         5-14        

 

 Serum or plasma urea nitrogen measurement (mass/volume)            39 mg/dL   
         7-18        

 

 Serum or plasma creatinine measurement (mass/volume)            1.95 mg/dL    
        0.60-1.30        

 

 Serum or plasma urea nitrogen/creatinine mass ratio            20             
NRG        

 

 Serum or plasma creatinine measurement with calculation of estimated 
glomerular filtration rate            26             NRG        

 

 Serum or plasma glucose measurement (mass/volume)            379 mg/dL        
            

 

 Serum or plasma calcium measurement (mass/volume)            8.5 mg/dL        
    8.5-10.1        









 Serum or plasma phosphate measurement (mass/volume) - 18 01:21         









 Serum or plasma phosphate measurement (mass/volume)            2.9 mg/dL      
      2.3-4.7        









 Magnesium - 18 01:21         









 Magnesium            2.2 mg/dL            1.8-2.4        









 Capillary blood glucose measurement by glucometer (mass/volume) - 18 02:
01         









 Capillary blood glucose measurement by glucometer (mass/volume)            374 
mg/dL                    









 Capillary blood glucose measurement by glucometer (mass/volume) - 18 02:
58         









 Capillary blood glucose measurement by glucometer (mass/volume)            368 
mg/dL                    









 Capillary blood glucose measurement by glucometer (mass/volume) - 18 04:
04         









 Capillary blood glucose measurement by glucometer (mass/volume)            274 
mg/dL                    









 Capillary blood glucose measurement by glucometer (mass/volume) - 18 04:
50         









 Capillary blood glucose measurement by glucometer (mass/volume)            244 
mg/dL                    









 Capillary blood glucose measurement by glucometer (mass/volume) - 18 06:
09         









 Capillary blood glucose measurement by glucometer (mass/volume)            175 
mg/dL                    









 Capillary blood glucose measurement by glucometer (mass/volume) - 18 06:
53         









 Capillary blood glucose measurement by glucometer (mass/volume)            140 
mg/dL                    









 Whole blood basic metabolic panel - 18 07:05         









 Serum or plasma sodium measurement (moles/volume)            139 mmol/L       
     135-145        

 

 Serum or plasma potassium measurement (moles/volume)            3.7 mmol/L    
        3.6-5.0        

 

 Serum or plasma chloride measurement (moles/volume)            112 mmol/L     
               

 

 Carbon dioxide            17 mmol/L            21-32        

 

 Serum or plasma anion gap determination (moles/volume)            10 mmol/L   
         5-14        

 

 Serum or plasma urea nitrogen measurement (mass/volume)            33 mg/dL   
         7-18        

 

 Serum or plasma creatinine measurement (mass/volume)            1.41 mg/dL    
        0.60-1.30        

 

 Serum or plasma urea nitrogen/creatinine mass ratio            23             
NRG        

 

 Serum or plasma creatinine measurement with calculation of estimated 
glomerular filtration rate            38             NRG        

 

 Serum or plasma glucose measurement (mass/volume)            120 mg/dL        
            

 

 Serum or plasma calcium measurement (mass/volume)            8.5 mg/dL        
    8.5-10.1        









 Hemoglobin A1c - 18 07:05         









 Blood hemoglobin A1C measurement (mass/volume)            12.8 %            4.0
-5.6        

 

 MEAN BLOOD GLUCOSE            321 %            <=126        









 Capillary blood glucose measurement by glucometer (mass/volume) - 18 07:
32         









 Capillary blood glucose measurement by glucometer (mass/volume)            110 
mg/dL                    









 Capillary blood glucose measurement by glucometer (mass/volume) - 18 08:
16         









 Capillary blood glucose measurement by glucometer (mass/volume)            100 
mg/dL                    









 Capillary blood glucose measurement by glucometer (mass/volume) - 18 08:
47         









 Capillary blood glucose measurement by glucometer (mass/volume)            90 
mg/dL                    









 Capillary blood glucose measurement by glucometer (mass/volume) - 18 09:
11         









 Capillary blood glucose measurement by glucometer (mass/volume)            110 
mg/dL                    









 Capillary blood glucose measurement by glucometer (mass/volume) - 18 10:
05         









 Capillary blood glucose measurement by glucometer (mass/volume)            158 
mg/dL                    









 Whole blood basic metabolic panel - 18 11:00         









 Serum or plasma sodium measurement (moles/volume)            138 mmol/L       
     135-145        

 

 Serum or plasma potassium measurement (moles/volume)            3.9 mmol/L    
        3.6-5.0        

 

 Serum or plasma chloride measurement (moles/volume)            108 mmol/L     
               

 

 Carbon dioxide            18 mmol/L            21-32        

 

 Serum or plasma anion gap determination (moles/volume)            12 mmol/L   
         5-14        

 

 Serum or plasma urea nitrogen measurement (mass/volume)            31 mg/dL   
         7-18        

 

 Serum or plasma creatinine measurement (mass/volume)            1.34 mg/dL    
        0.60-1.30        

 

 Serum or plasma urea nitrogen/creatinine mass ratio            23             
NRG        

 

 Serum or plasma creatinine measurement with calculation of estimated 
glomerular filtration rate            40             NRG        

 

 Serum or plasma glucose measurement (mass/volume)            177 mg/dL        
            

 

 Serum or plasma calcium measurement (mass/volume)            8.5 mg/dL        
    8.5-10.1        









 Capillary blood glucose measurement by glucometer (mass/volume) - 18 11:
28         









 Capillary blood glucose measurement by glucometer (mass/volume)            162 
mg/dL                    









 Capillary blood glucose measurement by glucometer (mass/volume) - 18 11:
58         









 Capillary blood glucose measurement by glucometer (mass/volume)            153 
mg/dL                    









 Capillary blood glucose measurement by glucometer (mass/volume) - 18 13:
58         









 Capillary blood glucose measurement by glucometer (mass/volume)            149 
mg/dL                    









 Complete blood count (CBC) with automated white blood cell (WBC) differential 
- 18 04:19         









 Blood leukocytes automated count (number/volume)            7.2 10*3/uL       
     4.3-11.0        

 

 Blood erythrocytes automated count (number/volume)            3.59 10*6/uL    
        4.35-5.85        

 

 Venous blood hemoglobin measurement (mass/volume)            10.6 g/dL        
    11.5-16.0        

 

 Blood hematocrit (volume fraction)            31 %            35-52        

 

 Automated erythrocyte mean corpuscular volume            87 [foz_us]          
  80-99        

 

 Automated erythrocyte mean corpuscular hemoglobin (mass per erythrocyte)      
      30 pg            25-34        

 

 Automated erythrocyte mean corpuscular hemoglobin concentration measurement (
mass/volume)            34 g/dL            32-36        

 

 Automated erythrocyte distribution width ratio            13.0 %            
10.0-14.5        

 

 Automated blood platelet count (count/volume)            278 10*3/uL          
  130-400        

 

 Automated blood platelet mean volume measurement            9.6 [foz_us]      
      7.4-10.4        

 

 Automated blood neutrophils/100 leukocytes            59 %            42-75   
     

 

 Automated blood lymphocytes/100 leukocytes            30 %            12-44   
     

 

 Blood monocytes/100 leukocytes            9 %            0-12        

 

 Automated blood eosinophils/100 leukocytes            2 %            0-10     
   

 

 Automated blood basophils/100 leukocytes            0 %            0-10        

 

 Blood neutrophils automated count (number/volume)            4.2 10*3         
   1.8-7.8        

 

 Blood lymphocytes automated count (number/volume)            2.2 10*3         
   1.0-4.0        

 

 Blood monocytes automated count (number/volume)            0.6 10*3            
0.0-1.0        

 

 Automated eosinophil count            0.1 10*3/uL            0.0-0.3        

 

 Automated blood basophil count (count/volume)            0.0 10*3/uL          
  0.0-0.1        









 Whole blood basic metabolic panel - 18 04:19         









 Serum or plasma sodium measurement (moles/volume)            144 mmol/L       
     135-145        

 

 Serum or plasma potassium measurement (moles/volume)            3.7 mmol/L    
        3.6-5.0        

 

 Serum or plasma chloride measurement (moles/volume)            115 mmol/L     
               

 

 Carbon dioxide            21 mmol/L            21-32        

 

 Serum or plasma anion gap determination (moles/volume)            8 mmol/L    
        5-14        

 

 Serum or plasma urea nitrogen measurement (mass/volume)            21 mg/dL   
         7-18        

 

 Serum or plasma creatinine measurement (mass/volume)            0.88 mg/dL    
        0.60-1.30        

 

 Serum or plasma urea nitrogen/creatinine mass ratio            24             
NRG        

 

 Serum or plasma creatinine measurement with calculation of estimated 
glomerular filtration rate            >             NRG        

 

 Serum or plasma glucose measurement (mass/volume)            100 mg/dL        
            

 

 Serum or plasma calcium measurement (mass/volume)            8.8 mg/dL        
    8.5-10.1        









 Serum or plasma phosphate measurement (mass/volume) - 18 04:19         









 Serum or plasma phosphate measurement (mass/volume)            3.2 mg/dL      
      2.3-4.7        









 Magnesium - 18 04:19         









 Magnesium            2.1 mg/dL            1.8-2.4        









 Capillary blood glucose measurement by glucometer (mass/volume) - 19 07:
22         









 Capillary blood glucose measurement by glucometer (mass/volume)            251 
mg/dL                    









 Complete blood count (CBC) with automated white blood cell (WBC) differential 
- 19 07:25         









 Blood leukocytes automated count (number/volume)            9.2 10*3/uL       
     4.3-11.0        

 

 Blood erythrocytes automated count (number/volume)            3.89 10*6/uL    
        4.35-5.85        

 

 Venous blood hemoglobin measurement (mass/volume)            11.3 g/dL        
    11.5-16.0        

 

 Blood hematocrit (volume fraction)            35 %            35-52        

 

 Automated erythrocyte mean corpuscular volume            91 [foz_us]          
  80-99        

 

 Automated erythrocyte mean corpuscular hemoglobin (mass per erythrocyte)      
      29 pg            25-34        

 

 Automated erythrocyte mean corpuscular hemoglobin concentration measurement (
mass/volume)            32 g/dL            32-36        

 

 Automated erythrocyte distribution width ratio            13.6 %            
10.0-14.5        

 

 Automated blood platelet count (count/volume)            234 10*3/uL          
  130-400        

 

 Automated blood platelet mean volume measurement            9.4 [foz_us]      
      7.4-10.4        

 

 Automated blood neutrophils/100 leukocytes            79 %            42-75   
     

 

 Automated blood lymphocytes/100 leukocytes            12 %            12-44   
     

 

 Blood monocytes/100 leukocytes            10 %            0-12        

 

 Automated blood eosinophils/100 leukocytes            0 %            0-10     
   

 

 Automated blood basophils/100 leukocytes            0 %            0-10        

 

 Blood neutrophils automated count (number/volume)            7.2 10*3         
   1.8-7.8        

 

 Blood lymphocytes automated count (number/volume)            1.1 10*3         
   1.0-4.0        

 

 Blood monocytes automated count (number/volume)            0.9 10*3            
0.0-1.0        

 

 Automated eosinophil count            0.0 10*3/uL            0.0-0.3        

 

 Automated blood basophil count (count/volume)            0.0 10*3/uL          
  0.0-0.1        









 Influenza virus A and B antigen detection - 19 07:25         









 FLU RESULT            NEGATIVE FOR INFLUENZA A AND B ANTIGENS BY IA           
  [facility]        









 Comprehensive metabolic panel - 19 07:25         









 Serum or plasma sodium measurement (moles/volume)            134 mmol/L       
     135-145        

 

 Serum or plasma potassium measurement (moles/volume)            4.3 mmol/L    
        3.6-5.0        

 

 Serum or plasma chloride measurement (moles/volume)            103 mmol/L     
               

 

 Carbon dioxide            23 mmol/L            21-32        

 

 Serum or plasma anion gap determination (moles/volume)            8 mmol/L    
        5-14        

 

 Serum or plasma urea nitrogen measurement (mass/volume)            15 mg/dL   
         7-18        

 

 Serum or plasma creatinine measurement (mass/volume)            1.06 mg/dL    
        0.60-1.30        

 

 Serum or plasma urea nitrogen/creatinine mass ratio            14             
NRG        

 

 Serum or plasma creatinine measurement with calculation of estimated 
glomerular filtration rate            53             NRG        

 

 Serum or plasma glucose measurement (mass/volume)            251 mg/dL        
            

 

 Serum or plasma calcium measurement (mass/volume)            9.7 mg/dL        
    8.5-10.1        

 

 Serum or plasma total bilirubin measurement (mass/volume)            0.5 mg/dL
            0.1-1.0        

 

 Serum or plasma alkaline phosphatase measurement (enzymatic activity/volume)  
          109 U/L                    

 

 Serum or plasma aspartate aminotransferase measurement (enzymatic activity/
volume)            16 U/L            5-34        

 

 Serum or plasma alanine aminotransferase measurement (enzymatic activity/volume
)            21 U/L            0-55        

 

 Serum or plasma protein measurement (mass/volume)            6.6 g/dL         
   6.4-8.2        

 

 Serum or plasma albumin measurement (mass/volume)            4.0 g/dL         
   3.2-4.5        

 

 CALCIUM CORRECTED            9.7 mg/dL            8.5-10.1        









 Serum or plasma phosphate measurement (mass/volume) - 19 07:25         









 Serum or plasma phosphate measurement (mass/volume)            3.2 mg/dL      
      2.3-4.7        









 Magnesium - 19 07:25         









 Magnesium            1.6 mg/dL            1.8-2.4        









 Complete urinalysis with reflex to culture - 19 07:27         









 Urine color determination            YELLOW             NRG        

 

 Urine clarity determination            CLOUDY             NRG        

 

 Urine pH measurement by test strip            6             5-9        

 

 Specific gravity of urine by test strip            1.010             1.016-
1.022        

 

 Urine protein assay by test strip, semi-quantitative            2+             
NEGATIVE        

 

 Urine glucose detection by automated test strip            3+             
NEGATIVE        

 

 Erythrocytes detection in urine sediment by light microscopy            2+    
         NEGATIVE        

 

 Urine ketones detection by automated test strip            1+             
NEGATIVE        

 

 Urine nitrite detection by test strip            NEGATIVE             NEGATIVE
        

 

 Urine total bilirubin detection by test strip            NEGATIVE             
NEGATIVE        

 

 Urine urobilinogen measurement by automated test strip (mass/volume)          
  NORMAL             NORMAL        

 

 Urine leukocyte esterase detection by dipstick            3+             
NEGATIVE        

 

 Automated urine sediment erythrocyte count by microscopy (number/high power 
field)             [HPF]            NRG        

 

 Automated urine sediment leukocyte count by microscopy (number/high power field
)             [HPF]            NRG        

 

 Bacteria detection in urine sediment by light microscopy            FEW       
      NRG        

 

 Squamous epithelial cells detection in urine sediment by light microscopy     
       10-25             NRG        

 

 Crystals detection in urine sediment by light microscopy            NONE      
       NRG        

 

 Casts detection in urine sediment by light microscopy            NONE         
    NRG        

 

 Mucus detection in urine sediment by light microscopy            SMALL        
     NRG        

 

 Complete urinalysis with reflex to culture            YES             NRG     
   

 

 Yeast detection in urine sediment by light microscopy            FEW          
   NRG        









 Bacterial urine culture - 19 07:27         









 Bacterial urine culture            SEE REPORT             NRG        

 

 COLONY COUNT            .             NRG        



                                                                               
                                 



Encounters

      





 ACCT No.            Visit Date/Time            Discharge            Status    
        Pt. Type            Provider            Facility            Loc./Unit  
          Complaint        

 

 [id number]            2013 16:13:00            2013 23:59:59          
  CLS            Outpatient            AV CRAIG MD                 
                              

 

 [id number]            2013 16:16:00            2013 23:59:59          
  CLS            Outpatient            MERCY ARCOS MD                       
                        

 

 [id number]            2013 16:11:00                                      
Document Registration                                                          
  

 

 [id number]            2019 05:54:00            2019 16:00:00    
        DIS            Outpatient            VIRGINIA GARCIA DO            [facility]            PREOP            COLONOSCOPY        

 

 [id number]            2019 06:29:00            2019 10:16:00    
        DIS            Outpatient            TYE MORALES MD            
[facility]            ER            DIABETES PROBLEMS,
KETO ACIDOSIS        

 

 [id number]            2018 12:45:00            2018 23:59:59    
        CLS            Outpatient            KRISTI JIMENES APRN            [facility] 
[facility]            RAD            SCREENING        

 

 [id number]            06/15/2018 18:04:00            2018 11:47:00    
        DIS            Inpatient            ED VILLA DO            [facility] 
[facility]            4TH            [place]        

 

 [id number]            2017 15:17:00            2017 23:59:59    
        CLS            Outpatient            ITZEL NEWSOME APRN            
[facility]            RAD            SCREENING        

 

 [id number]            2016 13:21:00            2016 23:59:59    
        CLS            Outpatient            MALORIE SEO            
[facility]            RAD            SCREENING        

 

 [id number]            2015 15:09:00            2015 23:59:59    
        CLS            Outpatient            IBAN SALAMANCA DO            [facility] 
[facility]            RAD            SCREENING        

 

 [id number]            2019 11:40:00                         PEN       
     Preadmit            VIRGINIA GARCIA DO            [facility]            ENDO            SCREENING        

 

 [id number]            2011 14:59:00                                   
   Document Registration                                                       
     

 

 [id number]            2011 16:46:00                                   
   Document Registration
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
## 2019-03-26 NOTE — OPERATIVE REPORT
DATE OF SERVICE:  03/26/2019



PREOPERATIVE DIAGNOSIS:

Screening colonoscopy.



POSTOPERATIVE DIAGNOSIS:

Normal colon.



PROCEDURE:

Colonoscopy.



SURGEON:

Virginia Louis DO



ANESTHESIA:

Per CRNA.



ESTIMATED BLOOD LOSS:

None.



COMPLICATIONS:

None.



INDICATIONS:

The patient is a 63-year-old female with a need for screening colonoscopy.  She

understands the risks and benefits of procedure and wished to proceed with

procedure.  Consent was signed on the chart.



DESCRIPTION OF PROCEDURE:

The patient was taken to the endoscopy suite, placed in left lateral recumbent

position.  Timeout was performed.  Digital rectal exam was performed.  There

were no palpable polyps, masses or ulcerations.  Scope was inserted into the

rectum and advanced all the way to the cecum with minimal difficulty.  Prep was

adequate with irrigation and suction.  Scope was then slowly retracted back.  No

polyps, masses or ulcerations in the cecum, ascending, transverse, descending

and sigmoid colon.  Once in the rectum, scope was also retroflexed noting no

other pathology.  Scope was returned to its normal position, slowly withdrawn

until completely removed.  The patient tolerated the procedure well without any

complications.  She was taken to recovery room in stable condition.



RECOMMENDATION:

This patient will need repeat colonoscopy in 10 years unless family history of

colon cancer or personal history of colon polyps, which would then be 5 years. 

If any issues before that, then will be seen at that time.





Job ID: [id number]

DocumentID: [id number]

Dictated Date:  03/26/2019 11:31:44

Transcription Date: 03/26/2019 15:11:56

Dictated By: VIRGINIA LOUIS DO

## 2019-03-26 NOTE — DISCHARGE INST-SIMPLE/STANDARD
Discharge Inst-Standard


Discharge Medications


New, Converted or Re-Newed RX:  RX on Chart





Patient Instructions/Follow Up


Plan of Care/Instructions/FU:  


repeat colonoscopy in 10 years unless family hx colon cancer or personal


hx polyps then 5 years.


any issues before that be seen at that time.


Activity as Tolerated:  Yes


Discharge Diet:  Regular Diet











VIRGINIA GARCIA DO Mar 26, 2019 11:27

## 2019-03-26 NOTE — PROGRESS NOTE-POST OPERATIVE
Post-Operative Progess Note


Surgeon (s)/Assistant (s)


Surgeon


VIRGINIA GARCIA DO


Assistant:  na





Pre-Operative Diagnosis


s creening colonoscopy





Post-Operative Diagnosis





normal colon





Procedure & Operative Findings


Date of Procedure


3/26/19


Procedure Performed/Findings


colonoscopy


Anesthesia Type


per crna





Estimated Blood Loss


Estimated blood loss (mL):  none





Specimens/Packing


Specimens Removed


na











VIRGINIA GARCIA DO Mar 26, 2019 11:27
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
## 2019-03-26 NOTE — PROGRESS NOTE-PRE OPERATIVE
Pre-Operative Progress Note


H&P Reviewed


The H&P was reviewed, patient examined and no changes noted.


Date Seen by Provider:  Mar 26, 2019


Time Seen by Provider:  10:03


Date H&P Reviewed:  Mar 26, 2019


Time H&P Reviewed:  10:03


Pre-Operative Diagnosis:  s creening colonoscopy











VIRGINIA GARCIA DO Mar 26, 2019 10:03

## 2019-03-26 NOTE — ANESTHESIA-GENERAL POST-OP
MAC


Patient Condition


Mental Status/LOC:  Same as Preop


Cardiovascular:  Satisfactory


Nausea/Vomiting:  Absent


Respiratory:  Satisfactory


Pain:  Controlled


Complications:  Absent





Post Op Complications


Complications


None





Follow Up Care/Instructions


Patient Instructions


None needed.





Anesthesiology Discharge Order


Discharge Order


Patient is doing well, no complaints, stable vital signs, no apparent adverse 

anesthesia problems.   


No complications reported per nursing.











DANA APARICIO CRNA Mar 26, 2019 13:35
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
## 2021-01-07 ENCOUNTER — HOSPITAL ENCOUNTER (OUTPATIENT)
Dept: HOSPITAL 75 - RAD | Age: 66
End: 2021-01-07
Attending: NURSE PRACTITIONER
Payer: COMMERCIAL

## 2021-01-07 DIAGNOSIS — I71.4: ICD-10-CM

## 2021-01-07 DIAGNOSIS — I77.89: Primary | ICD-10-CM

## 2021-01-07 DIAGNOSIS — K43.9: ICD-10-CM

## 2021-01-07 DIAGNOSIS — M54.6: ICD-10-CM

## 2021-01-07 DIAGNOSIS — I71.2: ICD-10-CM

## 2021-01-07 LAB
BUN/CREAT SERPL: 20
CREAT SERPL-MCNC: 1.18 MG/DL (ref 0.6–1.3)
GFR SERPLBLD BASED ON 1.73 SQ M-ARVRAT: 46 ML/MIN

## 2021-01-07 PROCEDURE — 84520 ASSAY OF UREA NITROGEN: CPT

## 2021-01-07 PROCEDURE — 74175 CTA ABDOMEN W/CONTRAST: CPT

## 2021-01-07 PROCEDURE — 82565 ASSAY OF CREATININE: CPT

## 2021-01-07 PROCEDURE — 36415 COLL VENOUS BLD VENIPUNCTURE: CPT

## 2021-01-07 NOTE — DIAGNOSTIC IMAGING REPORT
EXAMINATION: CT angiography of the abdomen.



TECHNIQUE: After intravenous administration of contrast, thin

section axial CT angiography of the abdomen and were obtained. 3D

MIP reformats were provided. All CT scans use one or more of the

following dose optimizing techniques: automated exposure control,

MA and/or KvP adjustment based on a patient size and exam type,

or iterative reconstruction. 



HISTORY: Aortic mass



COMPARISON: None available.



FINDINGS: 



The aorta demonstrates a few vascular calcifications without

aneurysm, dissection, or significant stenosis. The origin of the

celiac, SMA, renal arteries, and JORGE are normal.



Limited views of the lower thorax demonstrate bibasilar dependent

atelectasis.



There is a subcentimeter hepatic hypodensity which is too small

to characterize and requires no followup. Liver is otherwise

unremarkable. There is no biliary ductal dilation. Gallbladder is

normal.  Pancreas is normal.  Spleen is normal. Adrenal glands

are normal.



The kidneys are normal. There is no hydronephrosis.



The stomach and visualized small bowel are unremarkable without

obstruction.  No free fluid or air. No abdominal lymphadenopathy.

No intra-abdominal mass is seen.



Degenerative changes of the spine without suspicious osseous

lesion or compression fracture. There is a small midline

fat-containing hernia through a 0.6 cm defect on (series 2 image

74).



IMPRESSION:



1. Unremarkable appearance of the aorta and branching vessels

without aneurysm, dissection, or stenosis.

2. No focal abdominal mass is appreciated.

3. There is a small fat-containing midline abdominal wall hernia

3.06 cm defect.



Dictated by: 



  Dictated on workstation # [id number]

## 2021-07-09 ENCOUNTER — HOSPITAL ENCOUNTER (OUTPATIENT)
Dept: HOSPITAL 75 - CARD | Age: 66
End: 2021-07-09
Attending: INTERNAL MEDICINE
Payer: COMMERCIAL

## 2021-07-09 DIAGNOSIS — I51.7: Primary | ICD-10-CM

## 2021-07-09 DIAGNOSIS — I35.8: ICD-10-CM

## 2021-07-09 PROCEDURE — 93306 TTE W/DOPPLER COMPLETE: CPT

## 2021-12-29 ENCOUNTER — HOSPITAL ENCOUNTER (OUTPATIENT)
Dept: HOSPITAL 75 - PREOP | Age: 66
LOS: 5 days | Discharge: HOME | End: 2022-01-03
Attending: SPECIALIST
Payer: COMMERCIAL

## 2021-12-29 VITALS — WEIGHT: 198.42 LBS | HEIGHT: 67.32 IN | BODY MASS INDEX: 30.78 KG/M2

## 2021-12-29 DIAGNOSIS — Z01.818: Primary | ICD-10-CM

## 2022-01-07 ENCOUNTER — HOSPITAL ENCOUNTER (OUTPATIENT)
Dept: HOSPITAL 75 - SDC | Age: 67
Discharge: HOME | End: 2022-01-07
Attending: SPECIALIST
Payer: COMMERCIAL

## 2022-01-07 VITALS — SYSTOLIC BLOOD PRESSURE: 142 MMHG | DIASTOLIC BLOOD PRESSURE: 98 MMHG

## 2022-01-07 VITALS — WEIGHT: 198.42 LBS | HEIGHT: 67.32 IN | BODY MASS INDEX: 30.78 KG/M2

## 2022-01-07 VITALS — SYSTOLIC BLOOD PRESSURE: 133 MMHG | DIASTOLIC BLOOD PRESSURE: 73 MMHG

## 2022-01-07 DIAGNOSIS — Z79.84: ICD-10-CM

## 2022-01-07 DIAGNOSIS — H25.9: ICD-10-CM

## 2022-01-07 DIAGNOSIS — Z79.4: ICD-10-CM

## 2022-01-07 DIAGNOSIS — E11.40: ICD-10-CM

## 2022-01-07 DIAGNOSIS — E11.36: Primary | ICD-10-CM

## 2022-01-07 PROCEDURE — 66984 XCAPSL CTRC RMVL W/O ECP: CPT

## 2022-01-07 RX ADMIN — TETRACAINE HYDROCHLORIDE PRN ML: 5 SOLUTION OPHTHALMIC at 08:22

## 2022-01-07 RX ADMIN — PHENYLEPHRINE HYDROCHLORIDE SCH ML: 100 SOLUTION/ DROPS OPHTHALMIC at 08:22

## 2022-01-07 RX ADMIN — TETRACAINE HYDROCHLORIDE PRN ML: 5 SOLUTION OPHTHALMIC at 08:05

## 2022-01-07 RX ADMIN — TETRACAINE HYDROCHLORIDE PRN ML: 5 SOLUTION OPHTHALMIC at 08:16

## 2022-01-07 RX ADMIN — TETRACAINE HYDROCHLORIDE PRN ML: 5 SOLUTION OPHTHALMIC at 08:11

## 2022-01-07 RX ADMIN — TROPICAMIDE SCH ML: 10 SOLUTION/ DROPS OPHTHALMIC at 08:11

## 2022-01-07 RX ADMIN — TROPICAMIDE SCH ML: 10 SOLUTION/ DROPS OPHTHALMIC at 08:22

## 2022-01-07 RX ADMIN — PHENYLEPHRINE HYDROCHLORIDE SCH ML: 100 SOLUTION/ DROPS OPHTHALMIC at 08:11

## 2022-01-07 RX ADMIN — PHENYLEPHRINE HYDROCHLORIDE SCH ML: 100 SOLUTION/ DROPS OPHTHALMIC at 08:16

## 2022-01-07 RX ADMIN — TROPICAMIDE SCH ML: 10 SOLUTION/ DROPS OPHTHALMIC at 08:16

## 2022-01-07 NOTE — OPHTHALMOLOGIST PRE-OP NOTE
Pre-Operative Progress Note


H&P Reviewed


The H&P was reviewed, patient examined and no changes noted.


Date H&P Reviewed:  Jan 7, 2022


Time H&P Reviewed:  08:49


Pre-Op Dx


Cataract, Right Eye











DAVID BENAVIDEZ MD              Jan 7, 2022 08:50

## 2022-01-07 NOTE — OPHTHALMOLOGY OPERATIVE REPORT
Cataract removal/placement IOL


PREOPERATIVE DIAGNOSIS: Cataract Right Eye


POSTOPERATIVE DIAGNOSIS: Cataract Right Eye





PROCEDURE: Cataract removal and placement of posterior chamber implant, right 

eye





SURGEON: Gregorio Benavidez 





ANESTHESIA: Topical with sedation





COMPLICATIONS: None





ESTIMATED BLOOD LOSS: Minimal 





DESCRIPTION OF PROCEDURE:


After proper informed consent was obtained, the patient, a 66 female, was taken 

to the Operating Room and the right eye was anesthetized with tetracaine.  The 

right eye was then prepped and draped in the usual manner.  A wire lid speculum 

was placed. A paracentesis was made at the left hand position. Preservative free

lidocaine was injected into the anterior chamber followed by viscoelastic.  A 

clear corneal incision was made in the temporal position. A capsulorrhexis was 

preformed and the central nuclear and cortical material were removed.  The 

posterior capsule was polished and Cristopher 19.5 AU00T0  IOL was placed into the 

capsular bag. The residual viscoelastic was aspirated and balanced saline 

solution was injected into the anterior chamber. Moxifloxacin  was injected into

the anterior chamber.





The wound was checked and found to be water tight.





The patient tolerated the procedure well without complications.











GREGORIO BENAVIDEZ MD              Jan 7, 2022 09:13

## 2022-01-19 ENCOUNTER — HOSPITAL ENCOUNTER (OUTPATIENT)
Dept: HOSPITAL 75 - RAD | Age: 67
End: 2022-01-19
Attending: INTERNAL MEDICINE
Payer: COMMERCIAL

## 2022-01-19 DIAGNOSIS — E10.22: ICD-10-CM

## 2022-01-19 DIAGNOSIS — D63.1: ICD-10-CM

## 2022-01-19 DIAGNOSIS — I12.9: Primary | ICD-10-CM

## 2022-01-19 DIAGNOSIS — N18.31: ICD-10-CM

## 2022-01-19 PROCEDURE — 76770 US EXAM ABDO BACK WALL COMP: CPT

## 2022-01-21 ENCOUNTER — HOSPITAL ENCOUNTER (OUTPATIENT)
Dept: HOSPITAL 75 - SDC | Age: 67
End: 2022-01-21
Attending: SPECIALIST
Payer: COMMERCIAL

## 2022-01-21 VITALS — BODY MASS INDEX: 30.78 KG/M2 | WEIGHT: 198.42 LBS | HEIGHT: 67.32 IN

## 2022-01-21 VITALS — SYSTOLIC BLOOD PRESSURE: 129 MMHG | DIASTOLIC BLOOD PRESSURE: 64 MMHG

## 2022-01-21 VITALS — DIASTOLIC BLOOD PRESSURE: 71 MMHG | SYSTOLIC BLOOD PRESSURE: 124 MMHG

## 2022-01-21 DIAGNOSIS — Z79.899: ICD-10-CM

## 2022-01-21 DIAGNOSIS — E11.40: ICD-10-CM

## 2022-01-21 DIAGNOSIS — E11.36: Primary | ICD-10-CM

## 2022-01-21 DIAGNOSIS — Z79.4: ICD-10-CM

## 2022-01-21 DIAGNOSIS — Z87.891: ICD-10-CM

## 2022-01-21 DIAGNOSIS — H25.12: ICD-10-CM

## 2022-01-21 PROCEDURE — 66984 XCAPSL CTRC RMVL W/O ECP: CPT

## 2022-01-21 RX ADMIN — PHENYLEPHRINE HYDROCHLORIDE SCH ML: 100 SOLUTION/ DROPS OPHTHALMIC at 07:30

## 2022-01-21 RX ADMIN — TROPICAMIDE SCH ML: 10 SOLUTION/ DROPS OPHTHALMIC at 07:21

## 2022-01-21 RX ADMIN — TETRACAINE HYDROCHLORIDE PRN ML: 5 SOLUTION OPHTHALMIC at 07:14

## 2022-01-21 RX ADMIN — TROPICAMIDE SCH ML: 10 SOLUTION/ DROPS OPHTHALMIC at 07:25

## 2022-01-21 RX ADMIN — TETRACAINE HYDROCHLORIDE PRN ML: 5 SOLUTION OPHTHALMIC at 07:20

## 2022-01-21 RX ADMIN — TETRACAINE HYDROCHLORIDE PRN ML: 5 SOLUTION OPHTHALMIC at 07:25

## 2022-01-21 RX ADMIN — PHENYLEPHRINE HYDROCHLORIDE SCH ML: 100 SOLUTION/ DROPS OPHTHALMIC at 07:21

## 2022-01-21 RX ADMIN — TROPICAMIDE SCH ML: 10 SOLUTION/ DROPS OPHTHALMIC at 07:31

## 2022-01-21 RX ADMIN — TETRACAINE HYDROCHLORIDE PRN ML: 5 SOLUTION OPHTHALMIC at 07:30

## 2022-01-21 RX ADMIN — PHENYLEPHRINE HYDROCHLORIDE SCH ML: 100 SOLUTION/ DROPS OPHTHALMIC at 07:25

## 2022-01-21 NOTE — OPHTHALMOLOGY OPERATIVE REPORT
Cataract removal/placement IOL


PREOPERATIVE DIAGNOSIS:    Cataract Left Eye


POSTOPERATIVE DIAGNOSIS: Cataract Left Eye





PROCEDURE: Cataract removal and placement of posterior chamber implant, left eye





SURGEON: Gregorio Benavidez 





ANESTHESIA: Topical with sedation





COMPLICATIONS: None





ESTIMATED BLOOD LOSS: Minimal 





DESCRIPTION OF PROCEDURE:


After proper informed consent was obtained, the patient, a 66 female, was taken 

to the Operating Room and the left eye was anesthetized with tetracaine.  The 

left eye was then prepped and draped in the usual manner.  A wire lid speculum 

was placed. A paracentesis was made at the left hand position. Preservative free

lidocaine was injected into the anterior chamber followed by viscoelastic.  A 

clear corneal incision was made in the temporal position. A capsulorrhexis was 

preformed and the central nuclear and cortical material were removed.  The 

posterior capsule was polished and an Cristopher 18.0 [id number] was placed into the 

capsular bag. The residual viscoelastic was aspirated and balanced saline 

solution was injected into the anterior chamber.  Moxifloxacin was injected into

the anterior chamber.





The wound was checked and found to be water tight.





The patient tolerated the procedure well without complications.











GREGORIO BENAVIDEZ MD             Jan 21, 2022 08:41 Manual Repair Warning Statement: We plan on removing the manually selected variable below in favor of our much easier automatic structured text blocks found in the previous tab. We decided to do this to help make the flow better and give you the full power of structured data. Manual selection is never going to be ideal in our platform and I would encourage you to avoid using manual selection from this point on, especially since I will be sunsetting this feature. It is important that you do one of two things with the customized text below. First, you can save all of the text in a word file so you can have it for future reference. Second, transfer the text to the appropriate area in the Library tab. Lastly, if there is a flap or graft type which we do not have you need to let us know right away so I can add it in before the variable is hidden. No need to panic, we plan to give you roughly 6 months to make the change.

## 2022-01-21 NOTE — OPHTHALMOLOGIST PRE-OP NOTE
Pre-Operative Progress Note


H&P Reviewed


The H&P was reviewed, patient examined and no changes noted.


Date H&P Reviewed:  Jan 21, 2022


Time H&P Reviewed:  08:20


Pre-Op Dx


Cataract, Left Eye











DAVID BENAVIDEZ MD             Jan 21, 2022 08:20

## 2022-01-21 NOTE — ANESTHESIA-GENERAL POST-OP
MAC


Patient Condition


Mental Status/LOC:  Same as Preop


Cardiovascular:  Satisfactory


Nausea/Vomiting:  Absent


Respiratory:  Satisfactory


Pain:  Controlled


Complications:  Absent





Post Op Complications


Complications


None





Follow Up Care/Instructions


Patient Instructions


None needed.





Anesthesiology Discharge Order


Discharge Order


Patient is doing well, no complaints, stable vital signs, no apparent adverse 

anesthesia problems.   


No complications reported per nursing.











JOHNNY PEARSON CRNA            Jan 21, 2022 10:48

## 2022-02-08 ENCOUNTER — HOSPITAL ENCOUNTER (OUTPATIENT)
Dept: HOSPITAL 75 - LAB | Age: 67
End: 2022-02-08
Attending: INTERNAL MEDICINE
Payer: COMMERCIAL

## 2022-02-08 DIAGNOSIS — I12.9: Primary | ICD-10-CM

## 2022-02-08 DIAGNOSIS — D63.1: ICD-10-CM

## 2022-02-08 DIAGNOSIS — N18.31: ICD-10-CM

## 2022-02-08 DIAGNOSIS — E10.29: ICD-10-CM

## 2022-02-08 LAB
ALBUMIN SERPL-MCNC: 4.1 GM/DL (ref 3.2–4.5)
BASOPHILS # BLD AUTO: 0.1 10^3/UL (ref 0–0.1)
BASOPHILS NFR BLD AUTO: 1 % (ref 0–10)
BUN/CREAT SERPL: 18
CALCIUM SERPL-MCNC: 8.9 MG/DL (ref 8.5–10.1)
CHLORIDE SERPL-SCNC: 107 MMOL/L (ref 98–107)
CK SERPL-CCNC: 185 U/L (ref 29–168)
CO2 SERPL-SCNC: 23 MMOL/L (ref 21–32)
CREAT SERPL-MCNC: 1.24 MG/DL (ref 0.6–1.3)
EOSINOPHIL # BLD AUTO: 0.2 10^3/UL (ref 0–0.3)
EOSINOPHIL NFR BLD AUTO: 3 % (ref 0–10)
GFR SERPLBLD BASED ON 1.73 SQ M-ARVRAT: 48 ML/MIN
GLUCOSE SERPL-MCNC: 145 MG/DL (ref 70–105)
HCT VFR BLD CALC: 36 % (ref 35–52)
HGB BLD-MCNC: 11.5 G/DL (ref 11.5–16)
LYMPHOCYTES # BLD AUTO: 2.2 10^3/UL (ref 1–4)
LYMPHOCYTES NFR BLD AUTO: 31 % (ref 12–44)
MANUAL DIFFERENTIAL PERFORMED BLD QL: NO
MCH RBC QN AUTO: 30 PG (ref 25–34)
MCHC RBC AUTO-ENTMCNC: 32 G/DL (ref 32–36)
MCV RBC AUTO: 93 FL (ref 80–99)
MONOCYTES # BLD AUTO: 0.6 10^3/UL (ref 0–1)
MONOCYTES NFR BLD AUTO: 8 % (ref 0–12)
NEUTROPHILS # BLD AUTO: 4.1 10^3/UL (ref 1.8–7.8)
NEUTROPHILS NFR BLD AUTO: 58 % (ref 42–75)
PHOSPHATE SERPL-MCNC: 4.3 MG/DL (ref 2.3–4.7)
PLATELET # BLD: 271 10^3/UL (ref 130–400)
PMV BLD AUTO: 9.2 FL (ref 9–12.2)
POTASSIUM SERPL-SCNC: 4.2 MMOL/L (ref 3.6–5)
SODIUM SERPL-SCNC: 140 MMOL/L (ref 135–145)
URATE SERPL-MCNC: 4.4 MG/DL (ref 2.6–7.2)
WBC # BLD AUTO: 7.1 10^3/UL (ref 4.3–11)

## 2022-02-08 PROCEDURE — 85025 COMPLETE CBC W/AUTO DIFF WBC: CPT

## 2022-02-08 PROCEDURE — 84165 PROTEIN E-PHORESIS SERUM: CPT

## 2022-02-08 PROCEDURE — 80069 RENAL FUNCTION PANEL: CPT

## 2022-02-08 PROCEDURE — 82728 ASSAY OF FERRITIN: CPT

## 2022-02-08 PROCEDURE — 82607 VITAMIN B-12: CPT

## 2022-02-08 PROCEDURE — 86335 IMMUNFIX E-PHORSIS/URINE/CSF: CPT

## 2022-02-08 PROCEDURE — 83550 IRON BINDING TEST: CPT

## 2022-02-08 PROCEDURE — 82306 VITAMIN D 25 HYDROXY: CPT

## 2022-02-08 PROCEDURE — 83970 ASSAY OF PARATHORMONE: CPT

## 2022-02-08 PROCEDURE — 84155 ASSAY OF PROTEIN SERUM: CPT

## 2022-02-08 PROCEDURE — 84550 ASSAY OF BLOOD/URIC ACID: CPT

## 2022-02-08 PROCEDURE — 82746 ASSAY OF FOLIC ACID SERUM: CPT

## 2022-02-08 PROCEDURE — 36415 COLL VENOUS BLD VENIPUNCTURE: CPT

## 2022-02-08 PROCEDURE — 82550 ASSAY OF CK (CPK): CPT

## 2022-02-08 PROCEDURE — 83540 ASSAY OF IRON: CPT

## 2023-02-20 ENCOUNTER — HOSPITAL ENCOUNTER (OUTPATIENT)
Dept: HOSPITAL 75 - RAD | Age: 68
End: 2023-02-20
Attending: NURSE PRACTITIONER
Payer: COMMERCIAL

## 2023-02-20 DIAGNOSIS — F51.01: ICD-10-CM

## 2023-02-20 DIAGNOSIS — K21.9: ICD-10-CM

## 2023-02-20 DIAGNOSIS — E11.9: ICD-10-CM

## 2023-02-20 DIAGNOSIS — R13.19: ICD-10-CM

## 2023-02-20 DIAGNOSIS — N18.30: ICD-10-CM

## 2023-02-20 DIAGNOSIS — E03.8: Primary | ICD-10-CM

## 2023-02-20 DIAGNOSIS — Z76.0: ICD-10-CM

## 2023-02-20 DIAGNOSIS — R63.5: ICD-10-CM

## 2023-02-20 DIAGNOSIS — E04.1: ICD-10-CM

## 2023-02-20 DIAGNOSIS — E78.49: ICD-10-CM

## 2023-02-20 PROCEDURE — 76536 US EXAM OF HEAD AND NECK: CPT

## 2023-02-20 NOTE — DIAGNOSTIC IMAGING REPORT
PROCEDURE: 

US Thyroid.



TECHNIQUE: 

Multiple Real-time grayscale images were obtained of the thyroid

in various projections.



INDICATION:  

Hypothyroidism.



COMPARISON: 

None available.



FINDINGS: 

The right lobe of the thyroid gland measures 4.2 x 1.3 x 1.6 cm.

The left lobe of the thyroid gland measures 4.6 x 1.3 x 1.5 cm.

The thyroid gland demonstrates a significantly heterogeneous

echotexture without discrete thyroid nodule.



The isthmus is unremarkable.



IMPRESSION: 

No discrete thyroid nodule.



Diffuse heterogeneity of the thyroid gland which can be seen

secondary to sequelae of prior thyroiditis.



Dictated by: 



  Dictated on workstation # [id number]

## 2023-03-04 ENCOUNTER — HOSPITAL ENCOUNTER (EMERGENCY)
Dept: HOSPITAL 75 - ER | Age: 68
Discharge: HOME | End: 2023-03-04
Payer: COMMERCIAL

## 2023-03-04 VITALS — SYSTOLIC BLOOD PRESSURE: 164 MMHG | DIASTOLIC BLOOD PRESSURE: 76 MMHG

## 2023-03-04 VITALS — WEIGHT: 205.03 LBS | BODY MASS INDEX: 32.18 KG/M2 | HEIGHT: 66.93 IN

## 2023-03-04 DIAGNOSIS — E11.22: ICD-10-CM

## 2023-03-04 DIAGNOSIS — M79.10: Primary | ICD-10-CM

## 2023-03-04 DIAGNOSIS — T50.B95A: ICD-10-CM

## 2023-03-04 DIAGNOSIS — R05.9: ICD-10-CM

## 2023-03-04 DIAGNOSIS — R06.2: ICD-10-CM

## 2023-03-04 DIAGNOSIS — N18.30: ICD-10-CM

## 2023-03-04 DIAGNOSIS — R06.02: ICD-10-CM

## 2023-03-04 DIAGNOSIS — Z20.822: ICD-10-CM

## 2023-03-04 DIAGNOSIS — I12.9: ICD-10-CM

## 2023-03-04 DIAGNOSIS — R11.0: ICD-10-CM

## 2023-03-04 DIAGNOSIS — Z79.4: ICD-10-CM

## 2023-03-04 DIAGNOSIS — R63.0: ICD-10-CM

## 2023-03-04 DIAGNOSIS — Z87.891: ICD-10-CM

## 2023-03-04 DIAGNOSIS — R07.89: ICD-10-CM

## 2023-03-04 DIAGNOSIS — Z96.41: ICD-10-CM

## 2023-03-04 DIAGNOSIS — R51.9: ICD-10-CM

## 2023-03-04 LAB
ALBUMIN SERPL-MCNC: 3.8 GM/DL (ref 3.2–4.5)
ALP SERPL-CCNC: 115 U/L (ref 40–136)
ALT SERPL-CCNC: 21 U/L (ref 0–55)
APTT BLD: 33 SEC (ref 24–35)
APTT PPP: YELLOW S
BACTERIA #/AREA URNS HPF: NEGATIVE /HPF
BASOPHILS # BLD AUTO: 0 10^3/UL (ref 0–0.1)
BASOPHILS NFR BLD AUTO: 1 % (ref 0–10)
BILIRUB SERPL-MCNC: 0.4 MG/DL (ref 0.1–1)
BILIRUB UR QL STRIP: NEGATIVE
BUN/CREAT SERPL: 16
CALCIUM SERPL-MCNC: 8.8 MG/DL (ref 8.5–10.1)
CHLORIDE SERPL-SCNC: 109 MMOL/L (ref 98–107)
CO2 SERPL-SCNC: 19 MMOL/L (ref 21–32)
CREAT SERPL-MCNC: 1.2 MG/DL (ref 0.6–1.3)
EOSINOPHIL # BLD AUTO: 0.2 10^3/UL (ref 0–0.3)
EOSINOPHIL NFR BLD AUTO: 3 % (ref 0–10)
FIBRINOGEN PPP-MCNC: CLEAR MG/DL
GFR SERPLBLD BASED ON 1.73 SQ M-ARVRAT: 50 ML/MIN
GLUCOSE SERPL-MCNC: 133 MG/DL (ref 70–105)
GLUCOSE UR STRIP-MCNC: (no result) MG/DL
HCT VFR BLD CALC: 35 % (ref 35–52)
HGB BLD-MCNC: 11.2 G/DL (ref 11.5–16)
INR PPP: 1 (ref 0.8–1.4)
KETONES UR QL STRIP: (no result)
LEUKOCYTE ESTERASE UR QL STRIP: NEGATIVE
LYMPHOCYTES # BLD AUTO: 1 10^3/UL (ref 1–4)
LYMPHOCYTES NFR BLD AUTO: 15 % (ref 12–44)
MAGNESIUM SERPL-MCNC: 2.2 MG/DL (ref 1.6–2.4)
MANUAL DIFFERENTIAL PERFORMED BLD QL: NO
MCH RBC QN AUTO: 30 PG (ref 25–34)
MCHC RBC AUTO-ENTMCNC: 32 G/DL (ref 32–36)
MCV RBC AUTO: 93 FL (ref 80–99)
MONOCYTES # BLD AUTO: 0.6 10^3/UL (ref 0–1)
MONOCYTES NFR BLD AUTO: 9 % (ref 0–12)
NEUTROPHILS # BLD AUTO: 4.6 10^3/UL (ref 1.8–7.8)
NEUTROPHILS NFR BLD AUTO: 71 % (ref 42–75)
NITRITE UR QL STRIP: NEGATIVE
PH UR STRIP: 5.5 [PH] (ref 5–9)
PLATELET # BLD: 201 10^3/UL (ref 130–400)
PMV BLD AUTO: 9.2 FL (ref 9–12.2)
POTASSIUM SERPL-SCNC: 4.7 MMOL/L (ref 3.6–5)
PROT SERPL-MCNC: 6.3 GM/DL (ref 6.4–8.2)
PROT UR QL STRIP: NEGATIVE
PROTHROMBIN TIME: 13.1 SEC (ref 12.2–14.7)
RBC #/AREA URNS HPF: (no result) /HPF
SODIUM SERPL-SCNC: 140 MMOL/L (ref 135–145)
SP GR UR STRIP: 1.02 (ref 1.02–1.02)
SQUAMOUS #/AREA URNS HPF: (no result) /HPF
WBC # BLD AUTO: 6.5 10^3/UL (ref 4.3–11)
WBC #/AREA URNS HPF: (no result) /HPF

## 2023-03-04 PROCEDURE — 93005 ELECTROCARDIOGRAM TRACING: CPT

## 2023-03-04 PROCEDURE — 80053 COMPREHEN METABOLIC PANEL: CPT

## 2023-03-04 PROCEDURE — 93041 RHYTHM ECG TRACING: CPT

## 2023-03-04 PROCEDURE — 83735 ASSAY OF MAGNESIUM: CPT

## 2023-03-04 PROCEDURE — 85025 COMPLETE CBC W/AUTO DIFF WBC: CPT

## 2023-03-04 PROCEDURE — 85730 THROMBOPLASTIN TIME PARTIAL: CPT

## 2023-03-04 PROCEDURE — 81000 URINALYSIS NONAUTO W/SCOPE: CPT

## 2023-03-04 PROCEDURE — 87636 SARSCOV2 & INF A&B AMP PRB: CPT

## 2023-03-04 PROCEDURE — 87040 BLOOD CULTURE FOR BACTERIA: CPT

## 2023-03-04 PROCEDURE — 82947 ASSAY GLUCOSE BLOOD QUANT: CPT

## 2023-03-04 PROCEDURE — 85610 PROTHROMBIN TIME: CPT

## 2023-03-04 PROCEDURE — 71045 X-RAY EXAM CHEST 1 VIEW: CPT

## 2023-03-04 PROCEDURE — 87088 URINE BACTERIA CULTURE: CPT

## 2023-03-04 PROCEDURE — 83605 ASSAY OF LACTIC ACID: CPT

## 2023-03-04 PROCEDURE — 36415 COLL VENOUS BLD VENIPUNCTURE: CPT

## 2023-03-04 NOTE — ED GENERAL
General


Stated Complaint:  ACHY, NOT FEELING WELL, DIABETIC


Source of Information:  Patient





History of Present Illness


Date Seen by Provider:  Mar 4, 2023


Time Seen by Provider:  18:00


Initial Comments


PT ARRIVES VIA POV  FROM HOME


PT STATES SHE HAS NOT FELT WELL SINCE THURSDAY


C/O BODY ACHES, AND "JUST DOESN'T FEEL GOOD"


C/O NAUSEA, DECREASED APPETITE, HAS NOT EATEN TODAY--HAS HAD POPSICLES AND WATER

TODAY


HAS VOIDED X 3 TODAY, NO URINARY SYMPTOMS


NO BM TODAY, BUT HAD NORMAL BM YESTERDAY


NO ABDOMINAL PAIN 


PT UNAWARE OF FEVER, NO CHILLS


C/O NON-PRODUCTIVE COUGH


C/O SHORTNESS OF BREATH AND WHEEZING


C/O CHEST TIGHTNESS


C/O HEADACHE





NO ABDOMINAL PAIN 











PT IS DIABETIC. PT HAS INSULIN PUMP AND CGM--STATES BLOOD SUGARS 120'S TODAY. 


HAS NOT GIVEN HERSELF ANY EXTRA INSULIN TODAY, BUT HAS CONTINUED WITH INSULIN 

PUMP TODAY





PT HAS MULTIPLE SICK CONTACTS AT WORK AT [employer]





PT HAD ROUTINE EXAM WITH MONTY MARTINEZ AT [facility] ON WEDNESDAY AND SHE FELT FINE. 


SHE HAD HER 4TH COVID VACCINE THAT DAY


ALL THESE SYMPTOMS BEGAN THURSDAY MORNING


SHE WAS NOT TESTED FOR COVID PRIOR TO VACCINE. 





SHE HAS NOT TAKEN ANYTHING FOR SYMPTOMS. 





SHE HAS HAD SEASONAL FLU VACCINE. 





IN ADDITION TO DIABETES, PT HAS STAGE 3 KIDNEY DISEASE, HTN, HYPERLIPIDEMIA. 


SHE IS A FORMER SMOKER--SMOKED 1/2 PPD, QUIT . NO ALCOHOL OR DRUG USE





PCP: [facility] [place]





Allergies and Home Medications


Allergies


Coded Allergies:  


     Sulfa (Sulfonamide Antibiotics) (Verified  Allergy, Unknown, 3/19/19)





Patient Home Medication List


Albuterol Sulfate (Ventolin Hfa) 1 Puff Puff, 2 PUFF IH Q4H PRN for WHEEZING, 

(Reported)


   Entered as Reported by: DORETHA SENA on 1/3/22 1048


Atorvastatin Calcium (Atorvastatin Calcium) 40 Mg Tablet, 40 MG PO DAILY, 

(Reported)


   Entered as Reported by: JANESSA WADDELL on 22 0814


Black Cohosh (Black Cohosh) 540 Mg Capsule, 540 MG PO BID, (Reported)


   Entered as Reported by: JANESSA WADDELL on 22 0815


Bupropion HCl (Bupropion Xl) 300 Mg Tab.er.24h, 300 MG PO DAILY, (Reported)


   Entered as Reported by: CHAD VOGEL on 3/26/19 1048


Dapagliflozin Propanediol (Farxiga) 10 Mg Tablet, 10 MG PO DAILY, (Reported)


   Entered as Reported by: DORETHA SENA on 1/3/22 1048


Gabapentin (Gabapentin) 600 Mg Tablet, 600 MG PO TID, (Reported)


   Entered as Reported by: DORETHA SENA on 1/3/22 1048


Insulin Lispro (Humalog) 100 Unit/1 Ml Vial, 0 SQ for insulin pump, (Reported)


   Entered as Reported by: DORETHA SENA on 3/20/19 0856


Levothyroxine Sodium (Levothyroxine Sodium) 100 Mcg Tablet, 100 MCG PO BID, 

(Reported)


   Entered as Reported by: JANESSA WADDELL on 22 0813


Losartan/Hydrochlorothiazide (Losartan-Hctz 50-12.5 mg Tab) 1 Each Tablet, 1 

EACH PO DAILY, (Reported)


   Entered as Reported by: JANESSA WADDELL on 22 0813


Metformin HCl (Metformin HCl) 1,000 Mg Tablet, 1,000 MG PO BID, (Reported)


   Entered as Reported by: JANESSA WADDELL on 22 0814


Multivitamin (Multi-Vitamin Daily) 1 Each Tablet, 1 EACH PO DAILY, (Reported)


   Entered as Reported by: JANESSA WADDELL on 22 0814


Spironolactone (Spironolactone) 25 Mg Tablet, 25 MG PO DAILY, (Reported)


   Entered as Reported by: DORETHA SENA on 3/20/19 0856


Zolpidem Tartrate (Ambien) 5 Mg Tablet, 5 MG PO HS, (Reported)


   Entered as Reported by: DORETHA SENA on 1/3/22 1048





Review of Systems


Review of Systems


Constitutional:  see HPI, malaise, weakness


EENTM:  no symptoms reported


Respiratory:  see HPI, cough, short of breath, wheezing


Cardiovascular:  see HPI, chest pain


Gastrointestinal:  see HPI; No abdominal pain, No constipation, No diarrhea; 

loss of appetite, nausea; No vomiting


Genitourinary:  no symptoms reported


Musculoskeletal:  see HPI (BODY ACHES)


Skin:  no symptoms reported


Psychiatric/Neurological:  See HPI, Headache


Hematologic/Lymphatic:  No Symptoms Reported


Immunological/Allergic:  no symptoms reported





Past Medical-Social-Family Hx


Patient Social History


Tobacco Use?:  Yes


Tobacco type used:  Cigarettes


Smoking Status:  Former Smoker


Smokeless Tobacco Frequency:  Never a User


Use of E-Cig and/or Vaping Marcos:  Never a User


Substance use?:  No


Alcohol Use?:  No





Immunizations Up To Date


Tetanus Booster (TDap):  Unknown


PED Vaccines UTD:  No





Seasonal Allergies


Seasonal Allergies:  No





Past Medical History


Surgeries:  Yes (umb hernia, clavicle, knee scope)


Hysterectomy, Joint Replacement, Oophorectomy, Orthopedic


Respiratory:  No


Currently Using CPAP:  No


Currently Using BIPAP:  No


Cardiac:  Yes


High Cholesterol, Hypertension


Neurological:  No


Reproductive Disorders:  Yes


Female Reproductive Disorders:  Menstrual Problems


GYN History:  Hysterectomy, Menopausal


Sexually Transmitted Disease:  No


HIV/AIDS:  No


Genitourinary:  Yes


Renal Failure


Gastrointestinal:  No


Musculoskeletal:  Yes


Arthritis, Fractures


Endocrine:  Yes


Diabetes, Insulin dep, Hypothyroidsim


HEENT:  Yes


Cataract


Loss of Vision:  Denies


Hearing Impairment:  Denies


Cancer:  No


Psychosocial:  No


Integumentary:  No


Blood Disorders:  No


Adverse Reaction/Blood Tranf:  No





Family Medical History





Degenerative disc disease


  G8 BROTHER, Onset:60 years & older


FHx: rheumatoid arthritis


  G8 BROTHER, Onset:60 years & older


Lymphedema


  19 FATHER, , Age:88, Onset:60 years & older








SOCIAL HISTORY:


-SMOKED 1/2 PPD, QUIT 


-ETOH--DENIES USE


-DRUGS--DENIES USE





PAST SURGICAL HISTORY:


-LEFT CLAVICLE FX/ORIF


-BILATERAL TOTAL KNEE REPLACEMENTS


-





Physical Exam


Vital Signs





Vital Signs - First Documented








 3/4/23





 17:52


 


Temp 37.4


 


Pulse 79


 


Resp 20


 


B/P (MAP) 143/72 (95)


 


Pulse Ox 91


 


O2 Delivery Room Air


 


O2 Flow Rate 2.00





Capillary Refill :


Height, Weight, BMI


Height: 5'7.00"


Weight: 194lbs. 0.0oz. [id number]; 30.4 BMI


Method:Stated





Focused Exam


Lactate Level


3/4/23 18:05: Lactic Acid Level 0.63





Lactic Acid Level





Laboratory Tests








Test


 3/4/23


18:05


 


Lactic Acid Level


 0.63 MMOL/L


(0.50-2.00)











Progress/Results/Core Measures


Suspected Sepsis


SIRS


Temperature: 


Pulse:  


Respiratory Rate: 


 


Laboratory Tests


3/4/23 18:05: White Blood Count 6.5


Blood Pressure  / 


Mean: 


 





3/4/23 18:05: Lactic Acid Level 0.63


Laboratory Tests


3/4/23 18:05: 


Creatinine 1.20, INR Comment 1.0, Platelet Count 201, Total Bilirubin 0.4








Results/Orders


Lab Results





Laboratory Tests








Test


 3/4/23


18:05 3/4/23


18:25 3/4/23


18:26 3/4/23


18:42 Range/Units


 


 


White Blood Count


 6.5 


 


 


 


 4.3-11.0


10^3/uL


 


Red Blood Count


 3.74 L


 


 


 


 3.80-5.11


10^6/uL


 


Hemoglobin 11.2 L    11.5-16.0  g/dL


 


Hematocrit 35     35-52  %


 


Mean Corpuscular Volume 93     80-99  fL


 


Mean Corpuscular Hemoglobin 30     25-34  pg


 


Mean Corpuscular Hemoglobin


Concent 32 


 


 


 


 32-36  g/dL





 


Red Cell Distribution Width 13.2     10.0-14.5  %


 


Platelet Count


 201 


 


 


 


 130-400


10^3/uL


 


Mean Platelet Volume 9.2     9.0-12.2  fL


 


Immature Granulocyte % (Auto) 0      %


 


Neutrophils (%) (Auto) 71     42-75  %


 


Lymphocytes (%) (Auto) 15     12-44  %


 


Monocytes (%) (Auto) 9     0-12  %


 


Eosinophils (%) (Auto) 3     0-10  %


 


Basophils (%) (Auto) 1     0-10  %


 


Neutrophils # (Auto)


 4.6 


 


 


 


 1.8-7.8


10^3/uL


 


Lymphocytes # (Auto)


 1.0 


 


 


 


 1.0-4.0


10^3/uL


 


Monocytes # (Auto)


 0.6 


 


 


 


 0.0-1.0


10^3/uL


 


Eosinophils # (Auto)


 0.2 


 


 


 


 0.0-0.3


10^3/uL


 


Basophils # (Auto)


 0.0 


 


 


 


 0.0-0.1


10^3/uL


 


Immature Granulocyte # (Auto)


 0.0 


 


 


 


 0.0-0.1


10^3/uL


 


Prothrombin Time 13.1     12.2-14.7  SEC


 


INR Comment 1.0     0.8-1.4  


 


Activated Partial


Thromboplast Time 33 


 


 


 


 24-35  SEC





 


Sodium Level 140     135-145  MMOL/L


 


Potassium Level 4.7     3.6-5.0  MMOL/L


 


Chloride Level 109 H      MMOL/L


 


Carbon Dioxide Level 19 L    21-32  MMOL/L


 


Anion Gap 12     5-14  MMOL/L


 


Blood Urea Nitrogen 19 H    7-18  MG/DL


 


Creatinine


 1.20 


 


 


 


 0.60-1.30


MG/DL


 


Estimat Glomerular Filtration


Rate 50 


 


 


 


  





 


BUN/Creatinine Ratio 16      


 


Glucose Level 133 H      MG/DL


 


Lactic Acid Level


 0.63 


 


 


 


 0.50-2.00


MMOL/L


 


Calcium Level 8.8     8.5-10.1  MG/DL


 


Corrected Calcium 9.0     8.5-10.1  MG/DL


 


Magnesium Level 2.2     1.6-2.4  MG/DL


 


Total Bilirubin 0.4     0.1-1.0  MG/DL


 


Aspartate Amino Transf


(AST/SGOT) 19 


 


 


 


 5-34  U/L





 


Alanine Aminotransferase


(ALT/SGPT) 21 


 


 


 


 0-55  U/L





 


Alkaline Phosphatase 115       U/L


 


Total Protein 6.3 L    6.4-8.2  GM/DL


 


Albumin 3.8     3.2-4.5  GM/DL


 


Influenza Type A (RT-PCR)  Not Detected    Not Detecte  


 


Influenza Type B (RT-PCR)  Not Detected    Not Detecte  


 


SARS-CoV-2 RNA (RT-PCR)  Not Detected    Not Detecte  


 


Glucometer   120 H    MG/DL


 


Urine Color    YELLOW   


 


Urine Clarity    CLEAR   


 


Urine pH    5.5  5-9  


 


Urine Specific Gravity    1.020  1.016-1.022  


 


Urine Protein    NEGATIVE  NEGATIVE  


 


Urine Glucose (UA)    3+ H NEGATIVE  


 


Urine Ketones    1+ H NEGATIVE  


 


Urine Nitrite    NEGATIVE  NEGATIVE  


 


Urine Bilirubin    NEGATIVE  NEGATIVE  


 


Urine Urobilinogen    0.2  < = 1.0  MG/DL


 


Urine Leukocyte Esterase    NEGATIVE  NEGATIVE  


 


Urine RBC (Auto)    NEGATIVE  NEGATIVE  


 


Urine RBC    RARE   /HPF


 


Urine WBC    NONE   /HPF


 


Urine Squamous Epithelial


Cells 


 


 


 RARE 


  /HPF





 


Urine Crystals    NONE   /LPF


 


Urine Bacteria    NEGATIVE   /HPF


 


Urine Casts    NONE   /LPF


 


Urine Mucus    NEGATIVE   /LPF


 


Urine Culture Indicated


 


 


 


 CULTURE


PENDING  











My Orders





Orders - BLAKE ALSTON DO


Ed Iv/Invasive Line Start (3/4/23 17:57)


Monitor-Rhythm Ecg Trace Only (3/4/23 17:57)


Cbc With Automated Diff (3/4/23 17:57)


Comprehensive Metabolic Panel (3/4/23 17:57)


Magnesium (3/4/23 17:57)


Ua Culture If Indicated (3/4/23 17:57)


Covid 19 Inhouse Test (3/4/23 17:57)


Chest 1 View, Ap/Pa Only (3/4/23 17:57)


Influenza A And B By Pcr (3/4/23 17:57)


Isolation Central Supply Req (3/4/23 17:57)


Blood Culture (3/4/23 17:57)


Urine Culture (3/4/23 17:57)


Protime With Inr (3/4/23 17:57)


Partial Thromboplastin Time (3/4/23 17:57)


Ed Iv/Invasive Line Start (3/4/23 17:57)


Vital Signs Adult Sepsis Patie Q15M (3/4/23 17:57)


Remove Rings In Anticipation O (3/4/23 17:57)


Lactic Acid Analyzer (3/4/23 17:57)


Ed Iv/Invasive Line Start (3/4/23 17:57)


Ns Iv 1000 Ml (Sodium Chloride 0.9%) (3/4/23 18:00)


Accucheck Stat ONCE (3/4/23 17:57)


Ekg Tracing (3/4/23 18:23)





Vital Signs/I&O











 3/4/23





 17:52


 


Temp 37.4


 


Pulse 79


 


Resp 20


 


B/P (MAP) 143/72 (95)


 


Pulse Ox 91


 


O2 Delivery Room Air


 


O2 Flow Rate 2.00





Capillary Refill :





Diagnostic Imaging





Comments


CXR--PER RADIOLOGIST REPORT AT 1845





FINDINGS: Prominence of the mediastinum at the level of the


aortic knob, unchanged from prior, either atelectatic or


tortuous. No acute abnormality or interval change is found. The


lungs are clear. No failure, effusion or pneumothorax. 





IMPRESSION: Stable chest with prominence of the superior


mediastinum at the level of the aortic knob reflecting its


tortuosity and/or ectasia. No acute finding or change or adverse


change demonstrated.





Departure


Impression





   Primary Impression:  


   SIDE EFFECTS OF COVID VACCINE


Disposition:  01 HOME, SELF-CARE


Condition:  Stable





Departure-Patient Inst.


Decision time for Depature:  19:35


Referrals:  


ALAYNA MARTINEZ (PCP/Family)


Primary Care Physician


Patient Instructions:  COVID-19 Vaccines





Add. Discharge Instructions:  


HOME, REST





LOTS OF CLEAR LIQUIDS--WATER, BROTH, JELLO, GATORADE, POPSICLES, CLEAR JUICES





BRATS DIET--BANANAS, RICE, APPLESAUCE, TOAST, SALTINES





TYLENOL AND MOTRIN AS NEEDED FOR PAIN OR FEVER 





CONTINUE YOUR REGULAR MEDICATIONS AS PRESCRIBED





USE YOUR HOME INHALER AS NEEDED





FOLLOW UP WITH YOUR DR IN 2-3 DAYS IF  NO BETTER, RETURN TO ER IF WORSE


Scripts


Ondansetron (Ondansetron Odt) 4 Mg Tab.rapdis


4 MG PO Q4H for Nausea/Vomiting, #10 TAB


   Prov: BLAKE ALSTON DO         3/4/23











BLAKE ALSTON DO                  Mar 4, 2023 18:02

## 2023-03-04 NOTE — DIAGNOSTIC IMAGING REPORT
INDICATION: Pain and fever.



COMPARISON: Study of 06/16/2018. 



FINDINGS: Prominence of the mediastinum at the level of the

aortic knob, unchanged from prior, either atelectatic or

tortuous. No acute abnormality or interval change is found. The

lungs are clear. No failure, effusion or pneumothorax. 



IMPRESSION: Stable chest with prominence of the superior

mediastinum at the level of the aortic knob reflecting its

tortuosity and/or ectasia. No acute finding or change or adverse

change demonstrated. 



Dictated by: 



  Dictated on workstation # [id number]
